# Patient Record
Sex: MALE | Race: WHITE | NOT HISPANIC OR LATINO | Employment: OTHER | URBAN - METROPOLITAN AREA
[De-identification: names, ages, dates, MRNs, and addresses within clinical notes are randomized per-mention and may not be internally consistent; named-entity substitution may affect disease eponyms.]

---

## 2019-03-20 ENCOUNTER — OFFICE VISIT (OUTPATIENT)
Dept: UROLOGY | Facility: CLINIC | Age: 84
End: 2019-03-20
Attending: UROLOGY
Payer: MEDICARE

## 2019-03-20 VITALS
HEART RATE: 60 BPM | DIASTOLIC BLOOD PRESSURE: 64 MMHG | HEIGHT: 67 IN | SYSTOLIC BLOOD PRESSURE: 152 MMHG | BODY MASS INDEX: 21.35 KG/M2 | WEIGHT: 136 LBS

## 2019-03-20 DIAGNOSIS — N28.1 RENAL CYST: Primary | ICD-10-CM

## 2019-03-20 PROCEDURE — 1101F PR PT FALLS ASSESS DOC 0-1 FALLS W/OUT INJ PAST YR: ICD-10-PCS | Mod: CPTII,S$GLB,, | Performed by: UROLOGY

## 2019-03-20 PROCEDURE — 3077F SYST BP >= 140 MM HG: CPT | Mod: CPTII,S$GLB,, | Performed by: UROLOGY

## 2019-03-20 PROCEDURE — 1101F PT FALLS ASSESS-DOCD LE1/YR: CPT | Mod: CPTII,S$GLB,, | Performed by: UROLOGY

## 2019-03-20 PROCEDURE — 99204 OFFICE O/P NEW MOD 45 MIN: CPT | Mod: S$GLB,,, | Performed by: UROLOGY

## 2019-03-20 PROCEDURE — 3078F DIAST BP <80 MM HG: CPT | Mod: CPTII,S$GLB,, | Performed by: UROLOGY

## 2019-03-20 PROCEDURE — 99999 PR PBB SHADOW E&M-EST. PATIENT-LVL III: CPT | Mod: PBBFAC,,, | Performed by: UROLOGY

## 2019-03-20 PROCEDURE — 3078F PR MOST RECENT DIASTOLIC BLOOD PRESSURE < 80 MM HG: ICD-10-PCS | Mod: CPTII,S$GLB,, | Performed by: UROLOGY

## 2019-03-20 PROCEDURE — 99204 PR OFFICE/OUTPT VISIT, NEW, LEVL IV, 45-59 MIN: ICD-10-PCS | Mod: S$GLB,,, | Performed by: UROLOGY

## 2019-03-20 PROCEDURE — 3077F PR MOST RECENT SYSTOLIC BLOOD PRESSURE >= 140 MM HG: ICD-10-PCS | Mod: CPTII,S$GLB,, | Performed by: UROLOGY

## 2019-03-20 PROCEDURE — 99999 PR PBB SHADOW E&M-EST. PATIENT-LVL III: ICD-10-PCS | Mod: PBBFAC,,, | Performed by: UROLOGY

## 2019-03-20 RX ORDER — AMLODIPINE BESYLATE 5 MG/1
2.5 TABLET ORAL DAILY
COMMUNITY

## 2019-03-20 RX ORDER — MULTIVITAMIN WITH IRON
TABLET ORAL
COMMUNITY
End: 2022-05-04

## 2019-03-20 RX ORDER — FLUTICASONE PROPIONATE AND SALMETEROL 100; 50 UG/1; UG/1
1 POWDER RESPIRATORY (INHALATION) 2 TIMES DAILY
COMMUNITY
End: 2022-05-04

## 2019-03-20 RX ORDER — LORATADINE 10 MG/1
10 TABLET ORAL DAILY
COMMUNITY
End: 2022-05-04

## 2019-03-20 NOTE — LETTER
March 20, 2019      [place]  [address]  [facility], [facility] [address]           [facility]  [address] [facility] [address]  Phone: [phone]          Patient: Bhupinder Null   MR Number: [id number]   YOB: 1935   Date of Visit: 3/20/2019       Dear Shaun Meza:    Thank you for referring Bhupinder Null to me for evaluation. Attached you will find relevant portions of my assessment and plan of care.    If you have questions, please do not hesitate to call me. I look forward to following Bhupinder Null along with you.    Sincerely,    Blayne Abarca Jr., MD    Enclosure  CC:  No Recipients    If you would like to receive this communication electronically, please contact [email or web address] or [phone] to request more information on "[employer] Link access.    For providers and/or their staff who would like to refer a patient to [facility], please contact us through our one-stop-shop provider referral line, [employer] [facility], at [phone].    If you feel you have received this communication in error or would no longer like to receive these types of communications, please e-mail [email or web address]

## 2019-03-20 NOTE — PROGRESS NOTES
Subjective:       Patient ID: Bhupinder Null is a 84 y.o. male.    Chief Complaint: Follow-up (referral from pcp/ test showed cyst on kidney )   Patient is here for evaluation of a right parapelvic cyst.  There is no obstruction.  He occasionally has some right flank pain but there is no evidence of hydronephrosis on the CT scan.  He does have degenerative disc disease and I think this is probably what is causing his back pain.  He is voiding well and has no complaints  HPI      Past Medical History:   Diagnosis Date    Arthritis     Coronary artery disease     Diabetes mellitus     GERD (gastroesophageal reflux disease)     Hyperlipidemia     Hypertension     Melanoma 12/2014    left cheek    RLS (restless legs syndrome)     S/P CABG (coronary artery bypass graft)     *4    Stroke 2013    left side was affected  -residual left upper arm    Thyroid disease        Past Surgical History:   Procedure Laterality Date    ARTHROPLASTY-HIP Right 8/16/2016    [facility] by [facility] at [facility]    BYPASS GRAFT      CARDIAC SURGERY      4 vessel CABG    COSMETIC SURGERY      left side of face  skin graft from neck    EYE SURGERY      B cataract surgery    JOINT REPLACEMENT      REPAIR-MOHS DEFECT( left superior, inferior anterior cheek) Left 12/11/2014    Performed by Pedro Lancaster MD at [facility] OR [address] [place] [address]       Family History   Problem Relation Age of Onset    Stroke Mother     Diabetes type II Mother     Stomach cancer Father     Skin cancer Brother     Breast cancer Sister     Skin cancer Sister        Social History     Socioeconomic History    Marital status:      Spouse name: Not on file    Number of children: Not on file    Years of education: 7    Highest education level: Not on file   Social Needs    Financial resource strain: Not on file    Food insecurity - worry: Not on file    Food insecurity - inability: Not on file    Transportation needs - medical: Not  on file    Transportation needs - non-medical: Not on file   Occupational History    Not on file   Tobacco Use    Smoking status: Former Smoker     Years: 10.00     Types: Cigarettes     Last attempt to quit: 1961     Years since quittin.9    Smokeless tobacco: Never Used   Substance and Sexual Activity    Alcohol use: No     Alcohol/week: 0.0 oz    Drug use: No    Sexual activity: Yes     Partners: Female   Other Topics Concern    Not on file   Social History Narrative    Not on file       Allergies:  Tylenol pm [diphenhydramine-acetaminophen]    Medications:    Current Outpatient Medications:     amLODIPine (NORVASC) 5 MG tablet, Take 2.5 mg by mouth once daily., Disp: , Rfl:     ascorbic acid (VITAMIN C) 500 MG tablet, Take 500 mg by mouth once daily., Disp: , Rfl:     aspirin 81 MG Chew, Take 81 mg by mouth once daily., Disp: , Rfl:     atorvastatin (LIPITOR) 40 MG tablet, Take 40 mg by mouth once daily. , Disp: , Rfl:     carbidopa-levodopa  mg (SINEMET)  mg per tablet, Take 1 tablet by mouth every evening., Disp: , Rfl:     fluticasone-salmeterol diskus inhaler 100-50 mcg, Inhale 1 puff into the lungs 2 (two) times daily. Controller, Disp: , Rfl:     gabapentin (NEURONTIN) 100 MG capsule, 100 mg once daily. morning, Disp: , Rfl:     gabapentin (NEURONTIN) 300 MG capsule, , Disp: , Rfl:     insulin NPH-insulin regular, 70/30, (NOVOLIN 70/30) 100 unit/mL (70-30) injection, 30 units every morning, Disp: , Rfl:     levothyroxine (SYNTHROID) 50 MCG tablet, 50 mcg once daily. , Disp: , Rfl:     loratadine (CLARITIN) 10 mg tablet, Take 10 mg by mouth once daily., Disp: , Rfl:     losartan-hydrochlorothiazide 100-12.5 mg (HYZAAR) 100-12.5 mg Tab, Take 1 tablet by mouth once daily. , Disp: , Rfl:     ranitidine (ZANTAC) 150 MG capsule, Take 150 mg by mouth nightly., Disp: , Rfl:     vit A and D3 in cod liver oil (COD LIVER OIL) 1,250-135 unit Cap, Take by mouth., Disp: ,  Rfl:     Review of Systems   Constitutional: Negative for activity change, appetite change, chills, diaphoresis, fatigue, fever and unexpected weight change.   HENT: Negative for congestion, dental problem, hearing loss, mouth sores, postnasal drip, rhinorrhea, sinus pressure and trouble swallowing.    Eyes: Negative for pain, discharge and itching.   Respiratory: Negative for apnea, cough, choking, chest tightness, shortness of breath and wheezing.    Cardiovascular: Negative for chest pain, palpitations and leg swelling.   Gastrointestinal: Negative for abdominal distention, abdominal pain, anal bleeding, blood in stool, constipation, diarrhea, nausea, rectal pain and vomiting.   Endocrine: Negative for polydipsia and polyuria.   Genitourinary: Negative for decreased urine volume, difficulty urinating, discharge, dysuria, enuresis, flank pain, frequency, genital sores, hematuria, penile pain, penile swelling, scrotal swelling, testicular pain and urgency.   Musculoskeletal: Positive for back pain. Negative for arthralgias and myalgias.   Skin: Negative for color change, rash and wound.   Neurological: Negative for dizziness, syncope, speech difficulty, light-headedness and headaches.   Hematological: Negative for adenopathy. Does not bruise/bleed easily.   Psychiatric/Behavioral: Negative for behavioral problems, confusion, hallucinations and sleep disturbance.       Objective:      Physical Exam   Constitutional: He appears well-developed.   HENT:   Head: Normocephalic.   Cardiovascular: Normal rate.    Pulmonary/Chest: Effort normal.   Abdominal: Soft.   Genitourinary: Prostate normal.   Genitourinary Comments: Prostate is 2530 g and benign there are no prostate nodules.   Neurological: He is alert.   Skin: Skin is warm.     Psychiatric: He has a normal mood and affect.       Assessment:       1. Renal cyst        Plan:       Bhupinder was seen today for follow-up.    Diagnoses and all orders for this  visit:    Renal cyst     Renal cysts without evidence of obstruction or malignancy  Return to clinic p.r.n.

## 2021-10-06 ENCOUNTER — OFFICE VISIT (OUTPATIENT)
Dept: SURGERY | Facility: CLINIC | Age: 86
End: 2021-10-06
Payer: MEDICARE

## 2021-10-06 ENCOUNTER — LAB VISIT (OUTPATIENT)
Dept: LAB | Facility: HOSPITAL | Age: 86
End: 2021-10-06
Attending: SURGERY
Payer: MEDICARE

## 2021-10-06 VITALS
BODY MASS INDEX: 20.82 KG/M2 | SYSTOLIC BLOOD PRESSURE: 142 MMHG | HEART RATE: 59 BPM | RESPIRATION RATE: 18 BRPM | HEIGHT: 67 IN | DIASTOLIC BLOOD PRESSURE: 68 MMHG | WEIGHT: 132.63 LBS

## 2021-10-06 DIAGNOSIS — K44.9 HIATAL HERNIA: ICD-10-CM

## 2021-10-06 DIAGNOSIS — K44.0 PARAESOPHAGEAL HERNIA WITH OBSTRUCTION BUT NO GANGRENE: Primary | ICD-10-CM

## 2021-10-06 DIAGNOSIS — Z95.1 HX OF CABG: ICD-10-CM

## 2021-10-06 DIAGNOSIS — E11.65 UNCONTROLLED TYPE 2 DIABETES MELLITUS WITH HYPERGLYCEMIA: ICD-10-CM

## 2021-10-06 DIAGNOSIS — I10 PRIMARY HYPERTENSION: ICD-10-CM

## 2021-10-06 DIAGNOSIS — R13.19 ESOPHAGEAL DYSPHAGIA: ICD-10-CM

## 2021-10-06 DIAGNOSIS — R13.10 DYSPHAGIA, UNSPECIFIED TYPE: ICD-10-CM

## 2021-10-06 DIAGNOSIS — Z91.119 NONCOMPLIANCE OF PATIENT WITH DIETARY REGIMEN: ICD-10-CM

## 2021-10-06 LAB
ALBUMIN SERPL BCP-MCNC: 3.4 G/DL (ref 3.5–5.2)
ALP SERPL-CCNC: 166 U/L (ref 55–135)
ALT SERPL W/O P-5'-P-CCNC: 20 U/L (ref 10–44)
ANION GAP SERPL CALC-SCNC: 11 MMOL/L (ref 8–16)
AST SERPL-CCNC: 23 U/L (ref 10–40)
BASOPHILS # BLD AUTO: 0.05 K/UL (ref 0–0.2)
BASOPHILS NFR BLD: 0.7 % (ref 0–1.9)
BILIRUB SERPL-MCNC: 0.4 MG/DL (ref 0.1–1)
BUN SERPL-MCNC: 17 MG/DL (ref 8–23)
CALCIUM SERPL-MCNC: 9.4 MG/DL (ref 8.7–10.5)
CHLORIDE SERPL-SCNC: 98 MMOL/L (ref 95–110)
CO2 SERPL-SCNC: 24 MMOL/L (ref 23–29)
CREAT SERPL-MCNC: 0.9 MG/DL (ref 0.5–1.4)
DIFFERENTIAL METHOD: ABNORMAL
EOSINOPHIL # BLD AUTO: 0.2 K/UL (ref 0–0.5)
EOSINOPHIL NFR BLD: 2.4 % (ref 0–8)
ERYTHROCYTE [DISTWIDTH] IN BLOOD BY AUTOMATED COUNT: 12.5 % (ref 11.5–14.5)
EST. GFR  (AFRICAN AMERICAN): >60 ML/MIN/1.73 M^2
EST. GFR  (NON AFRICAN AMERICAN): >60 ML/MIN/1.73 M^2
ESTIMATED AVG GLUCOSE: 295 MG/DL (ref 68–131)
GLUCOSE SERPL-MCNC: 141 MG/DL (ref 70–110)
HBA1C MFR BLD: 11.9 % (ref 4–5.6)
HCT VFR BLD AUTO: 33.4 % (ref 40–54)
HGB BLD-MCNC: 11.6 G/DL (ref 14–18)
IMM GRANULOCYTES # BLD AUTO: 0.03 K/UL (ref 0–0.04)
IMM GRANULOCYTES NFR BLD AUTO: 0.4 % (ref 0–0.5)
LYMPHOCYTES # BLD AUTO: 1 K/UL (ref 1–4.8)
LYMPHOCYTES NFR BLD: 13.7 % (ref 18–48)
MCH RBC QN AUTO: 32.5 PG (ref 27–31)
MCHC RBC AUTO-ENTMCNC: 34.7 G/DL (ref 32–36)
MCV RBC AUTO: 94 FL (ref 82–98)
MONOCYTES # BLD AUTO: 0.6 K/UL (ref 0.3–1)
MONOCYTES NFR BLD: 8.5 % (ref 4–15)
NEUTROPHILS # BLD AUTO: 5.2 K/UL (ref 1.8–7.7)
NEUTROPHILS NFR BLD: 74.3 % (ref 38–73)
NRBC BLD-RTO: 0 /100 WBC
PLATELET # BLD AUTO: 292 K/UL (ref 150–450)
PMV BLD AUTO: 9.3 FL (ref 9.2–12.9)
POTASSIUM SERPL-SCNC: 4.3 MMOL/L (ref 3.5–5.1)
PREALB SERPL-MCNC: 13 MG/DL (ref 20–43)
PROT SERPL-MCNC: 6.5 G/DL (ref 6–8.4)
RBC # BLD AUTO: 3.57 M/UL (ref 4.6–6.2)
SODIUM SERPL-SCNC: 133 MMOL/L (ref 136–145)
WBC # BLD AUTO: 7.03 K/UL (ref 3.9–12.7)

## 2021-10-06 PROCEDURE — 99205 PR OFFICE/OUTPT VISIT, NEW, LEVL V, 60-74 MIN: ICD-10-PCS | Mod: S$GLB,,, | Performed by: SURGERY

## 2021-10-06 PROCEDURE — 99205 OFFICE O/P NEW HI 60 MIN: CPT | Mod: S$GLB,,, | Performed by: SURGERY

## 2021-10-06 PROCEDURE — 3288F PR FALLS RISK ASSESSMENT DOCUMENTED: ICD-10-PCS | Mod: CPTII,S$GLB,, | Performed by: SURGERY

## 2021-10-06 PROCEDURE — 1160F RVW MEDS BY RX/DR IN RCRD: CPT | Mod: CPTII,S$GLB,, | Performed by: SURGERY

## 2021-10-06 PROCEDURE — 1160F PR REVIEW ALL MEDS BY PRESCRIBER/CLIN PHARMACIST DOCUMENTED: ICD-10-PCS | Mod: CPTII,S$GLB,, | Performed by: SURGERY

## 2021-10-06 PROCEDURE — 1157F ADVNC CARE PLAN IN RCRD: CPT | Mod: CPTII,S$GLB,, | Performed by: SURGERY

## 2021-10-06 PROCEDURE — 1125F AMNT PAIN NOTED PAIN PRSNT: CPT | Mod: CPTII,S$GLB,, | Performed by: SURGERY

## 2021-10-06 PROCEDURE — 99999 PR PBB SHADOW E&M-EST. PATIENT-LVL IV: ICD-10-PCS | Mod: PBBFAC,,, | Performed by: SURGERY

## 2021-10-06 PROCEDURE — 80053 COMPREHEN METABOLIC PANEL: CPT | Performed by: SURGERY

## 2021-10-06 PROCEDURE — 1101F PR PT FALLS ASSESS DOC 0-1 FALLS W/OUT INJ PAST YR: ICD-10-PCS | Mod: CPTII,S$GLB,, | Performed by: SURGERY

## 2021-10-06 PROCEDURE — 1101F PT FALLS ASSESS-DOCD LE1/YR: CPT | Mod: CPTII,S$GLB,, | Performed by: SURGERY

## 2021-10-06 PROCEDURE — 1159F PR MEDICATION LIST DOCUMENTED IN MEDICAL RECORD: ICD-10-PCS | Mod: CPTII,S$GLB,, | Performed by: SURGERY

## 2021-10-06 PROCEDURE — 99999 PR PBB SHADOW E&M-EST. PATIENT-LVL IV: CPT | Mod: PBBFAC,,, | Performed by: SURGERY

## 2021-10-06 PROCEDURE — 83036 HEMOGLOBIN GLYCOSYLATED A1C: CPT | Performed by: SURGERY

## 2021-10-06 PROCEDURE — 85025 COMPLETE CBC W/AUTO DIFF WBC: CPT | Performed by: SURGERY

## 2021-10-06 PROCEDURE — 1157F PR ADVANCE CARE PLAN OR EQUIV PRESENT IN MEDICAL RECORD: ICD-10-PCS | Mod: CPTII,S$GLB,, | Performed by: SURGERY

## 2021-10-06 PROCEDURE — 84134 ASSAY OF PREALBUMIN: CPT | Performed by: SURGERY

## 2021-10-06 PROCEDURE — 1125F PR PAIN SEVERITY QUANTIFIED, PAIN PRESENT: ICD-10-PCS | Mod: CPTII,S$GLB,, | Performed by: SURGERY

## 2021-10-06 PROCEDURE — 36415 COLL VENOUS BLD VENIPUNCTURE: CPT | Performed by: SURGERY

## 2021-10-06 PROCEDURE — 1159F MED LIST DOCD IN RCRD: CPT | Mod: CPTII,S$GLB,, | Performed by: SURGERY

## 2021-10-06 PROCEDURE — 3288F FALL RISK ASSESSMENT DOCD: CPT | Mod: CPTII,S$GLB,, | Performed by: SURGERY

## 2021-10-06 RX ORDER — ONDANSETRON 4 MG/1
4 TABLET, ORALLY DISINTEGRATING ORAL EVERY 6 HOURS PRN
COMMUNITY
Start: 2021-10-01 | End: 2022-05-04

## 2021-10-06 RX ORDER — OLMESARTAN MEDOXOMIL AND HYDROCHLOROTHIAZIDE 40/12.5 40; 12.5 MG/1; MG/1
TABLET ORAL
COMMUNITY
End: 2022-05-04

## 2021-10-06 RX ORDER — NABUMETONE 500 MG/1
TABLET, FILM COATED ORAL
COMMUNITY
End: 2022-05-04

## 2021-10-06 RX ORDER — PANTOPRAZOLE SODIUM 40 MG/1
TABLET, DELAYED RELEASE ORAL
COMMUNITY

## 2021-10-06 RX ORDER — DICYCLOMINE HYDROCHLORIDE 20 MG/1
20 TABLET ORAL EVERY 6 HOURS PRN
COMMUNITY
Start: 2021-10-01 | End: 2022-05-04

## 2021-10-06 RX ORDER — HYDROXYZINE HYDROCHLORIDE 10 MG/1
TABLET, FILM COATED ORAL
COMMUNITY
End: 2022-05-04

## 2021-10-06 RX ORDER — LOSARTAN POTASSIUM 25 MG/1
25 TABLET ORAL NIGHTLY
COMMUNITY
Start: 2021-09-27 | End: 2022-05-04

## 2021-10-06 RX ORDER — ROPINIROLE 1 MG/1
1 TABLET, FILM COATED ORAL
COMMUNITY
Start: 2021-10-04

## 2021-10-06 RX ORDER — INSULIN LISPRO 100 [IU]/ML
INJECTION, SUSPENSION SUBCUTANEOUS
COMMUNITY

## 2021-10-06 RX ORDER — NAPROXEN 500 MG/1
TABLET ORAL
COMMUNITY
End: 2022-05-04

## 2022-05-03 PROBLEM — Z91.148 H/O MEDICATION NONCOMPLIANCE: Status: ACTIVE | Noted: 2022-05-03

## 2022-05-03 PROBLEM — I50.9 ACUTE EXACERBATION OF CONGESTIVE HEART FAILURE: Status: ACTIVE | Noted: 2022-05-03

## 2022-05-03 PROBLEM — I51.89 DIASTOLIC DYSFUNCTION: Status: ACTIVE | Noted: 2022-05-03

## 2022-05-03 PROBLEM — M79.89 LEG SWELLING: Status: ACTIVE | Noted: 2022-05-03

## 2022-07-15 ENCOUNTER — HOSPITAL ENCOUNTER (INPATIENT)
Facility: HOSPITAL | Age: 87
LOS: 2 days | Discharge: HOME | DRG: 327 | End: 2022-07-18
Attending: EMERGENCY MEDICINE | Admitting: SURGERY
Payer: MEDICARE

## 2022-07-15 DIAGNOSIS — K31.1 GASTRIC OUTLET OBSTRUCTION: ICD-10-CM

## 2022-07-15 DIAGNOSIS — K44.9 HIATAL HERNIA: Primary | ICD-10-CM

## 2022-07-15 PROCEDURE — 99285 PR EMERGENCY DEPT VISIT,LEVEL V: ICD-10-PCS | Mod: ,,, | Performed by: EMERGENCY MEDICINE

## 2022-07-15 PROCEDURE — 99285 EMERGENCY DEPT VISIT HI MDM: CPT | Mod: ,,, | Performed by: EMERGENCY MEDICINE

## 2022-07-16 ENCOUNTER — ANESTHESIA EVENT (OUTPATIENT)
Dept: ENDOSCOPY | Facility: HOSPITAL | Age: 87
DRG: 327 | End: 2022-07-16
Payer: MEDICARE

## 2022-07-16 ENCOUNTER — ANESTHESIA (OUTPATIENT)
Dept: ENDOSCOPY | Facility: HOSPITAL | Age: 87
DRG: 327 | End: 2022-07-16
Payer: MEDICARE

## 2022-07-16 PROBLEM — K31.1 GASTRIC OUTLET OBSTRUCTION: Status: ACTIVE | Noted: 2022-07-16

## 2022-07-16 LAB
ALBUMIN SERPL BCP-MCNC: 3.1 G/DL (ref 3.5–5.2)
ALP SERPL-CCNC: 97 U/L (ref 55–135)
ALT SERPL W/O P-5'-P-CCNC: 11 U/L (ref 10–44)
ANION GAP SERPL CALC-SCNC: 10 MMOL/L (ref 8–16)
AST SERPL-CCNC: 17 U/L (ref 10–40)
BASOPHILS # BLD AUTO: 0.05 K/UL (ref 0–0.2)
BASOPHILS NFR BLD: 0.5 % (ref 0–1.9)
BILIRUB SERPL-MCNC: 0.8 MG/DL (ref 0.1–1)
BUN SERPL-MCNC: 39 MG/DL (ref 8–23)
CALCIUM SERPL-MCNC: 8.7 MG/DL (ref 8.7–10.5)
CHLORIDE SERPL-SCNC: 105 MMOL/L (ref 95–110)
CO2 SERPL-SCNC: 30 MMOL/L (ref 23–29)
CREAT SERPL-MCNC: 1.9 MG/DL (ref 0.5–1.4)
DIFFERENTIAL METHOD: ABNORMAL
EOSINOPHIL # BLD AUTO: 0.1 K/UL (ref 0–0.5)
EOSINOPHIL NFR BLD: 1.5 % (ref 0–8)
ERYTHROCYTE [DISTWIDTH] IN BLOOD BY AUTOMATED COUNT: 18.6 % (ref 11.5–14.5)
EST. GFR  (AFRICAN AMERICAN): 35.8 ML/MIN/1.73 M^2
EST. GFR  (NON AFRICAN AMERICAN): 31 ML/MIN/1.73 M^2
ESTIMATED AVG GLUCOSE: 169 MG/DL (ref 68–131)
GLUCOSE SERPL-MCNC: 276 MG/DL (ref 70–110)
HBA1C MFR BLD: 7.5 % (ref 4–5.6)
HCT VFR BLD AUTO: 29.4 % (ref 40–54)
HGB BLD-MCNC: 9.6 G/DL (ref 14–18)
IMM GRANULOCYTES # BLD AUTO: 0.04 K/UL (ref 0–0.04)
IMM GRANULOCYTES NFR BLD AUTO: 0.4 % (ref 0–0.5)
LACTATE SERPL-SCNC: 1.4 MMOL/L (ref 0.5–2.2)
LYMPHOCYTES # BLD AUTO: 0.6 K/UL (ref 1–4.8)
LYMPHOCYTES NFR BLD: 6.8 % (ref 18–48)
MAGNESIUM SERPL-MCNC: 2.3 MG/DL (ref 1.6–2.6)
MCH RBC QN AUTO: 30.2 PG (ref 27–31)
MCHC RBC AUTO-ENTMCNC: 32.7 G/DL (ref 32–36)
MCV RBC AUTO: 93 FL (ref 82–98)
MONOCYTES # BLD AUTO: 0.8 K/UL (ref 0.3–1)
MONOCYTES NFR BLD: 8.1 % (ref 4–15)
NEUTROPHILS # BLD AUTO: 7.8 K/UL (ref 1.8–7.7)
NEUTROPHILS NFR BLD: 82.7 % (ref 38–73)
NRBC BLD-RTO: 0 /100 WBC
PHOSPHATE SERPL-MCNC: 3.1 MG/DL (ref 2.7–4.5)
PLATELET # BLD AUTO: 190 K/UL (ref 150–450)
PMV BLD AUTO: 10 FL (ref 9.2–12.9)
POCT GLUCOSE: 200 MG/DL (ref 70–110)
POCT GLUCOSE: 275 MG/DL (ref 70–110)
POCT GLUCOSE: 307 MG/DL (ref 70–110)
POTASSIUM SERPL-SCNC: 3.9 MMOL/L (ref 3.5–5.1)
PROT SERPL-MCNC: 6.4 G/DL (ref 6–8.4)
RBC # BLD AUTO: 3.18 M/UL (ref 4.6–6.2)
SODIUM SERPL-SCNC: 145 MMOL/L (ref 136–145)
WBC # BLD AUTO: 9.48 K/UL (ref 3.9–12.7)

## 2022-07-16 PROCEDURE — 99285 EMERGENCY DEPT VISIT HI MDM: CPT | Mod: 25

## 2022-07-16 PROCEDURE — 25000003 PHARM REV CODE 250: Performed by: NURSE ANESTHETIST, CERTIFIED REGISTERED

## 2022-07-16 PROCEDURE — 37000008 HC ANESTHESIA 1ST 15 MINUTES: Performed by: INTERNAL MEDICINE

## 2022-07-16 PROCEDURE — 43241 PR EGD, FLEX, W/INSERT, INTRALUMINAL TUBE/CATH: ICD-10-PCS | Mod: GC,,, | Performed by: INTERNAL MEDICINE

## 2022-07-16 PROCEDURE — 96374 THER/PROPH/DIAG INJ IV PUSH: CPT

## 2022-07-16 PROCEDURE — 43241 EGD TUBE/CATH INSERTION: CPT | Mod: GC,,, | Performed by: INTERNAL MEDICINE

## 2022-07-16 PROCEDURE — 63600175 PHARM REV CODE 636 W HCPCS: Performed by: STUDENT IN AN ORGANIZED HEALTH CARE EDUCATION/TRAINING PROGRAM

## 2022-07-16 PROCEDURE — 80053 COMPREHEN METABOLIC PANEL: CPT | Performed by: STUDENT IN AN ORGANIZED HEALTH CARE EDUCATION/TRAINING PROGRAM

## 2022-07-16 PROCEDURE — 99223 1ST HOSP IP/OBS HIGH 75: CPT | Mod: 25,,, | Performed by: INTERNAL MEDICINE

## 2022-07-16 PROCEDURE — 84100 ASSAY OF PHOSPHORUS: CPT | Performed by: STUDENT IN AN ORGANIZED HEALTH CARE EDUCATION/TRAINING PROGRAM

## 2022-07-16 PROCEDURE — 63600175 PHARM REV CODE 636 W HCPCS: Performed by: EMERGENCY MEDICINE

## 2022-07-16 PROCEDURE — 11000001 HC ACUTE MED/SURG PRIVATE ROOM

## 2022-07-16 PROCEDURE — C9113 INJ PANTOPRAZOLE SODIUM, VIA: HCPCS | Performed by: STUDENT IN AN ORGANIZED HEALTH CARE EDUCATION/TRAINING PROGRAM

## 2022-07-16 PROCEDURE — 63600175 PHARM REV CODE 636 W HCPCS: Performed by: NURSE ANESTHETIST, CERTIFIED REGISTERED

## 2022-07-16 PROCEDURE — 99223 PR INITIAL HOSPITAL CARE,LEVL III: ICD-10-PCS | Mod: AI,GC,, | Performed by: SURGERY

## 2022-07-16 PROCEDURE — D9220A PRA ANESTHESIA: Mod: ANES,,, | Performed by: ANESTHESIOLOGY

## 2022-07-16 PROCEDURE — 83605 ASSAY OF LACTIC ACID: CPT | Performed by: STUDENT IN AN ORGANIZED HEALTH CARE EDUCATION/TRAINING PROGRAM

## 2022-07-16 PROCEDURE — C9399 UNCLASSIFIED DRUGS OR BIOLOG: HCPCS | Performed by: STUDENT IN AN ORGANIZED HEALTH CARE EDUCATION/TRAINING PROGRAM

## 2022-07-16 PROCEDURE — 99223 1ST HOSP IP/OBS HIGH 75: CPT | Mod: AI,GC,, | Performed by: SURGERY

## 2022-07-16 PROCEDURE — 85025 COMPLETE CBC W/AUTO DIFF WBC: CPT | Performed by: STUDENT IN AN ORGANIZED HEALTH CARE EDUCATION/TRAINING PROGRAM

## 2022-07-16 PROCEDURE — 25000003 PHARM REV CODE 250: Performed by: STUDENT IN AN ORGANIZED HEALTH CARE EDUCATION/TRAINING PROGRAM

## 2022-07-16 PROCEDURE — 43241 EGD TUBE/CATH INSERTION: CPT | Performed by: INTERNAL MEDICINE

## 2022-07-16 PROCEDURE — 99223 PR INITIAL HOSPITAL CARE,LEVL III: ICD-10-PCS | Mod: 25,,, | Performed by: INTERNAL MEDICINE

## 2022-07-16 PROCEDURE — D9220A PRA ANESTHESIA: Mod: CRNA,,, | Performed by: NURSE ANESTHETIST, CERTIFIED REGISTERED

## 2022-07-16 PROCEDURE — D9220A PRA ANESTHESIA: ICD-10-PCS | Mod: CRNA,,, | Performed by: NURSE ANESTHETIST, CERTIFIED REGISTERED

## 2022-07-16 PROCEDURE — 83036 HEMOGLOBIN GLYCOSYLATED A1C: CPT | Performed by: STUDENT IN AN ORGANIZED HEALTH CARE EDUCATION/TRAINING PROGRAM

## 2022-07-16 PROCEDURE — 83735 ASSAY OF MAGNESIUM: CPT | Performed by: STUDENT IN AN ORGANIZED HEALTH CARE EDUCATION/TRAINING PROGRAM

## 2022-07-16 PROCEDURE — D9220A PRA ANESTHESIA: ICD-10-PCS | Mod: ANES,,, | Performed by: ANESTHESIOLOGY

## 2022-07-16 PROCEDURE — 37000009 HC ANESTHESIA EA ADD 15 MINS: Performed by: INTERNAL MEDICINE

## 2022-07-16 RX ORDER — SUCCINYLCHOLINE CHLORIDE 20 MG/ML
INJECTION INTRAMUSCULAR; INTRAVENOUS
Status: DISCONTINUED | OUTPATIENT
Start: 2022-07-16 | End: 2022-07-16

## 2022-07-16 RX ORDER — HYDROMORPHONE HYDROCHLORIDE 1 MG/ML
0.2 INJECTION, SOLUTION INTRAMUSCULAR; INTRAVENOUS; SUBCUTANEOUS EVERY 4 HOURS PRN
Status: DISCONTINUED | OUTPATIENT
Start: 2022-07-16 | End: 2022-07-16

## 2022-07-16 RX ORDER — HEPARIN SODIUM 5000 [USP'U]/ML
5000 INJECTION, SOLUTION INTRAVENOUS; SUBCUTANEOUS EVERY 8 HOURS
Status: DISCONTINUED | OUTPATIENT
Start: 2022-07-16 | End: 2022-07-18 | Stop reason: HOSPADM

## 2022-07-16 RX ORDER — GLUCAGON 1 MG
1 KIT INJECTION
Status: DISCONTINUED | OUTPATIENT
Start: 2022-07-16 | End: 2022-07-18 | Stop reason: HOSPADM

## 2022-07-16 RX ORDER — PROPOFOL 10 MG/ML
VIAL (ML) INTRAVENOUS
Status: DISCONTINUED | OUTPATIENT
Start: 2022-07-16 | End: 2022-07-16

## 2022-07-16 RX ORDER — EPHEDRINE SULFATE 50 MG/ML
INJECTION, SOLUTION INTRAVENOUS
Status: DISCONTINUED | OUTPATIENT
Start: 2022-07-16 | End: 2022-07-16

## 2022-07-16 RX ORDER — PANTOPRAZOLE SODIUM 40 MG/10ML
40 INJECTION, POWDER, LYOPHILIZED, FOR SOLUTION INTRAVENOUS DAILY
Status: DISCONTINUED | OUTPATIENT
Start: 2022-07-16 | End: 2022-07-18 | Stop reason: HOSPADM

## 2022-07-16 RX ORDER — LIDOCAINE HCL/PF 100 MG/5ML
SYRINGE (ML) INTRAVENOUS
Status: DISCONTINUED | OUTPATIENT
Start: 2022-07-16 | End: 2022-07-16

## 2022-07-16 RX ORDER — MORPHINE SULFATE 4 MG/ML
4 INJECTION, SOLUTION INTRAMUSCULAR; INTRAVENOUS EVERY 4 HOURS PRN
Status: DISCONTINUED | OUTPATIENT
Start: 2022-07-16 | End: 2022-07-18 | Stop reason: HOSPADM

## 2022-07-16 RX ORDER — FENTANYL CITRATE 50 UG/ML
INJECTION, SOLUTION INTRAMUSCULAR; INTRAVENOUS
Status: DISCONTINUED | OUTPATIENT
Start: 2022-07-16 | End: 2022-07-16

## 2022-07-16 RX ORDER — ROCURONIUM BROMIDE 10 MG/ML
INJECTION, SOLUTION INTRAVENOUS
Status: DISCONTINUED | OUTPATIENT
Start: 2022-07-16 | End: 2022-07-16

## 2022-07-16 RX ORDER — INSULIN ASPART 100 [IU]/ML
0-5 INJECTION, SOLUTION INTRAVENOUS; SUBCUTANEOUS EVERY 6 HOURS PRN
Status: DISCONTINUED | OUTPATIENT
Start: 2022-07-16 | End: 2022-07-18 | Stop reason: HOSPADM

## 2022-07-16 RX ORDER — ENOXAPARIN SODIUM 100 MG/ML
40 INJECTION SUBCUTANEOUS EVERY 24 HOURS
Status: DISCONTINUED | OUTPATIENT
Start: 2022-07-16 | End: 2022-07-16

## 2022-07-16 RX ORDER — SODIUM CHLORIDE 0.9 % (FLUSH) 0.9 %
10 SYRINGE (ML) INJECTION
Status: DISCONTINUED | OUTPATIENT
Start: 2022-07-16 | End: 2022-07-18 | Stop reason: HOSPADM

## 2022-07-16 RX ORDER — ONDANSETRON 2 MG/ML
4 INJECTION INTRAMUSCULAR; INTRAVENOUS EVERY 6 HOURS
Status: DISCONTINUED | OUTPATIENT
Start: 2022-07-16 | End: 2022-07-18 | Stop reason: HOSPADM

## 2022-07-16 RX ORDER — MIDAZOLAM HYDROCHLORIDE 1 MG/ML
0.5 INJECTION INTRAMUSCULAR; INTRAVENOUS
Status: COMPLETED | OUTPATIENT
Start: 2022-07-16 | End: 2022-07-16

## 2022-07-16 RX ORDER — NAPROXEN SODIUM 220 MG/1
81 TABLET, FILM COATED ORAL DAILY
Status: DISCONTINUED | OUTPATIENT
Start: 2022-07-16 | End: 2022-07-18 | Stop reason: HOSPADM

## 2022-07-16 RX ORDER — MIDAZOLAM HYDROCHLORIDE 1 MG/ML
0.5 INJECTION INTRAMUSCULAR; INTRAVENOUS ONCE
Status: DISCONTINUED | OUTPATIENT
Start: 2022-07-16 | End: 2022-07-16

## 2022-07-16 RX ORDER — SODIUM CHLORIDE, SODIUM LACTATE, POTASSIUM CHLORIDE, CALCIUM CHLORIDE 600; 310; 30; 20 MG/100ML; MG/100ML; MG/100ML; MG/100ML
INJECTION, SOLUTION INTRAVENOUS CONTINUOUS
Status: DISCONTINUED | OUTPATIENT
Start: 2022-07-16 | End: 2022-07-18 | Stop reason: HOSPADM

## 2022-07-16 RX ORDER — MORPHINE SULFATE 2 MG/ML
2 INJECTION, SOLUTION INTRAMUSCULAR; INTRAVENOUS EVERY 4 HOURS PRN
Status: DISCONTINUED | OUTPATIENT
Start: 2022-07-16 | End: 2022-07-18 | Stop reason: HOSPADM

## 2022-07-16 RX ORDER — LEVOTHYROXINE SODIUM ANHYDROUS 100 UG/5ML
25 INJECTION, POWDER, LYOPHILIZED, FOR SOLUTION INTRAVENOUS DAILY
Status: DISCONTINUED | OUTPATIENT
Start: 2022-07-16 | End: 2022-07-17

## 2022-07-16 RX ADMIN — SODIUM CHLORIDE: 0.9 INJECTION, SOLUTION INTRAVENOUS at 11:07

## 2022-07-16 RX ADMIN — ROCURONIUM BROMIDE 5 MG: 10 INJECTION, SOLUTION INTRAVENOUS at 12:07

## 2022-07-16 RX ADMIN — ONDANSETRON 4 MG: 2 INJECTION INTRAMUSCULAR; INTRAVENOUS at 06:07

## 2022-07-16 RX ADMIN — MIDAZOLAM 0.5 MG: 1 INJECTION INTRAMUSCULAR; INTRAVENOUS at 12:07

## 2022-07-16 RX ADMIN — Medication 60 MG: at 12:07

## 2022-07-16 RX ADMIN — ONDANSETRON 4 MG: 2 INJECTION INTRAMUSCULAR; INTRAVENOUS at 02:07

## 2022-07-16 RX ADMIN — SUCCINYLCHOLINE CHLORIDE 120 MG: 20 INJECTION, SOLUTION INTRAMUSCULAR; INTRAVENOUS at 12:07

## 2022-07-16 RX ADMIN — PANTOPRAZOLE SODIUM 40 MG: 40 INJECTION, POWDER, FOR SOLUTION INTRAVENOUS at 09:07

## 2022-07-16 RX ADMIN — INSULIN DETEMIR 8 UNITS: 100 INJECTION, SOLUTION SUBCUTANEOUS at 09:07

## 2022-07-16 RX ADMIN — HEPARIN SODIUM 5000 UNITS: 5000 INJECTION INTRAVENOUS; SUBCUTANEOUS at 09:07

## 2022-07-16 RX ADMIN — SODIUM CHLORIDE, SODIUM LACTATE, POTASSIUM CHLORIDE, AND CALCIUM CHLORIDE: .6; .31; .03; .02 INJECTION, SOLUTION INTRAVENOUS at 02:07

## 2022-07-16 RX ADMIN — FENTANYL CITRATE 50 MCG: 50 INJECTION, SOLUTION INTRAMUSCULAR; INTRAVENOUS at 11:07

## 2022-07-16 RX ADMIN — SODIUM CHLORIDE, SODIUM LACTATE, POTASSIUM CHLORIDE, AND CALCIUM CHLORIDE 1000 ML: .6; .31; .03; .02 INJECTION, SOLUTION INTRAVENOUS at 09:07

## 2022-07-16 RX ADMIN — HEPARIN SODIUM 5000 UNITS: 5000 INJECTION INTRAVENOUS; SUBCUTANEOUS at 02:07

## 2022-07-16 RX ADMIN — ONDANSETRON 4 MG: 2 INJECTION INTRAMUSCULAR; INTRAVENOUS at 11:07

## 2022-07-16 RX ADMIN — EPHEDRINE SULFATE 15 MG: 50 INJECTION INTRAVENOUS at 12:07

## 2022-07-16 RX ADMIN — PROPOFOL 80 MG: 10 INJECTION, EMULSION INTRAVENOUS at 12:07

## 2022-07-16 RX ADMIN — ASPIRIN 81 MG CHEWABLE TABLET 81 MG: 81 TABLET CHEWABLE at 09:07

## 2022-07-16 RX ADMIN — EPHEDRINE SULFATE 10 MG: 50 INJECTION INTRAVENOUS at 12:07

## 2022-07-16 RX ADMIN — INSULIN ASPART 3 UNITS: 100 INJECTION, SOLUTION INTRAVENOUS; SUBCUTANEOUS at 11:07

## 2022-07-16 RX ADMIN — LEVOTHYROXINE SODIUM 25 MCG: 20 INJECTION, SOLUTION INTRAVENOUS at 11:07

## 2022-07-16 NOTE — HPI
Bhupinder Null is a 87 y.o. male with PMHx of HTN, CAD s/p CABG x4 who presents to ED as transfer with gastric outlet obstruction. Two days ago he developed upper abdominal pain.  He had one small episode of emesis at that time, denies emesis today. He notes a BM today. Nurse at OSH made two attempts at NGT placement but unsuccessful and patient refused further attempts. He has had similar symptoms in the past, was previously evaluated for HH repair though never scheduled. Denies prior abd surgeries.   CT demonstrates a markedly dilated stomach. He is hemodynamically stable. WBC of 11.9, labs overall unremarkable.

## 2022-07-16 NOTE — PROGRESS NOTES
GI Post Procedure Treatment Plan    Interval history:  EGD completed.   Large paraesophageal hernia with a large amount of food. NGT placed with difficulty, there was resistant in the nasopharynx.  Gastric erythema.  Dilated duodenum.    Plan:  - NPO  - NGT to LIS  - consider upper GI series  - can repeat EGD on Monday for evaluation for gastric masses, however 90% of the stomach was visualized, there were no masses in the antrum or pylorus which with excellent visualization.    Yvonne Catalan MD  GI Fellow, PGY-6

## 2022-07-16 NOTE — ANESTHESIA PREPROCEDURE EVALUATION
[facility]  Anesthesia Pre-Operative Evaluation         Patient Name: Bhupinder Null  YOB: 1935  MRN: [id number]    SUBJECTIVE:     Pre-operative evaluation for Procedure(s) (LRB):  EGD (ESOPHAGOGASTRODUODENOSCOPY) (N/A)     07/16/2022    Bhupinder Null is a 87 y.o. male w/ a significant PMHx of HTN, CVA, CAD s/p CABG x4, CHF, DM II and hypothyroidism who presents to ED as transfer with gastric outlet obstruction. NGT placement unsuccessful in the ED. Will now be placed with GI.     Patient now presents for the above procedure(s).    At the time of my evaluation, pt oriented to self and partly to situation, not to time. Consent obtained from family. Labs significant for ORESTES [sCr 1.9] and hyperglycemia [307].    2D ECHO [05/04/2022]:  TTE:  - EF 35%  - severe left atrium enlargement  - moderate MR, mild AI  - right ventricular systolic function is mildly decreased. Pa pressure 28 mmHg    LDA:        Peripheral IV - Single Lumen 07/15/22 20 G Left Forearm (Active)   Site Assessment Clean;Dry;Intact 07/15/22 1743   Number of days: 1       Prev airway[2014]:   Method of Intubation: Direct laryngoscopy  Mask Ventilation: Easy - oral  Grade: Grade II  Complicating Factors: Anterior larynx, Large/Floppy epiglottis    Drips:    lactated ringers 125 mL/hr at 07/16/22 0202       Patient Active Problem List   Diagnosis    Stroke    HTN (hypertension)    Hx of 4 vessel CABG    CAD (coronary artery disease)    Poorly controlled diabetes mellitus    Enlarged LA (left atrium)    Noncompliance of patient with dietary regimen    Mohs defect of left cheek    Hip arthritis    OA (osteoarthritis) of hip    Acute exacerbation of congestive heart failure    Leg swelling    H/O medication noncompliance    Diastolic dysfunction    Gastric outlet obstruction       Review of patient's allergies indicates:   Allergen Reactions    Tylenol pm [diphenhydramine-acetaminophen]      hallucinates        Current Inpatient Medications:   aspirin  81 mg Oral Daily    heparin (porcine)  5,000 Units Subcutaneous Q8H    insulin detemir U-100  8 Units Subcutaneous Daily    levothyroxine  25 mcg Intravenous Daily    ondansetron  4 mg Intravenous Q6H    pantoprazole  40 mg Intravenous Daily       No current facility-administered medications on file prior to encounter.     Current Outpatient Medications on File Prior to Encounter   Medication Sig Dispense Refill    amLODIPine (NORVASC) 5 MG tablet Take 2.5 mg by mouth once daily.      aspirin 81 MG Chew Take 81 mg by mouth once daily.      atorvastatin (LIPITOR) 40 MG tablet Take 40 mg by mouth once daily.       cilostazol (PLETAL) 50 MG Tab Take 50 mg by mouth 2 (two) times daily.      furosemide (LASIX) 20 MG tablet Take 1 tablet (20 mg total) by mouth once daily. 90 tablet 3    gabapentin (NEURONTIN) 100 MG capsule 100 mg 2 (two) times daily.      insulin lispro protamine-insulin lispro (HUMALOG MIX 75-25,U-100,INSULN) 100 unit/mL (75-25) Susp Humalog Mix 75-25 (U-100) Insulin 100 unit/mL subcutaneous suspension   INJECT 20 UNITS UNDER THE SKIN EVERY DAY      levothyroxine (TIROSINT) 75 mcg Cap Take 1 capsule by mouth once daily.      meloxicam (MOBIC) 7.5 MG tablet Take 7.5 mg by mouth 2 (two) times daily.      ondansetron (ZOFRAN-ODT) 8 MG TbDL Take 1 tablet (8 mg total) by mouth every 8 (eight) hours as needed (Nausea). 60 tablet 3    pantoprazole (PROTONIX) 40 MG tablet pantoprazole 40 mg tablet,delayed release   TAKE 1 TABLET BY MOUTH EVERY DAY AS DIRECTED      rOPINIRole (REQUIP) 1 MG tablet Take 1 mg by mouth. Take 1 tablet in morning. Take 3 tablets at night.      senna-docusate 8.6-50 mg (PERICOLACE) 8.6-50 mg per tablet Take 1 tablet by mouth 2 (two) times daily. 60 tablet 3       Past Surgical History:   Procedure Laterality Date    BYPASS GRAFT      CARDIAC SURGERY      4 vessel CABG    COSMETIC SURGERY      left side of face  skin  graft from neck    EYE SURGERY      B cataract surgery    JOINT REPLACEMENT         OBJECTIVE:     Vital Signs Range (Last 24H):  Temp:  [36.8 °C (98.3 °F)-37.3 °C (99.1 °F)]   Pulse:  [55-82]   Resp:  [16-20]   BP: (113-163)/(58-82)   SpO2:  [93 %-99 %]       Significant Labs:  Lab Results   Component Value Date    WBC 9.48 07/16/2022    HGB 9.6 (L) 07/16/2022    HCT 29.4 (L) 07/16/2022     07/16/2022     07/16/2022    K 3.9 07/16/2022     07/16/2022    CREATININE 1.9 (H) 07/16/2022    BUN 39 (H) 07/16/2022    CO2 30 (H) 07/16/2022    INR 1.1 08/18/2016    HGBA1C 7.5 (H) 07/16/2022       Diagnostic Studies: No relevant studies.    EKG:   Results for orders placed or performed during the hospital encounter of 07/15/22   EKG 12-lead    Collection Time: 07/15/22  5:54 PM    Narrative    Test Reason : R11.2,    Vent. Rate : 081 BPM     Atrial Rate : 081 BPM     P-R Int : 178 ms          QRS Dur : 136 ms      QT Int : 462 ms       P-R-T Axes : 085 -41 056 degrees     QTc Int : 536 ms    Normal sinus rhythm  Left axis deviation  Right bundle branch block  Abnormal ECG  When compared with ECG of 02-JUN-2022 16:18,  The axis Shifted left  Nonspecific T wave abnormality no longer evident in Lateral leads  QT has lengthened    Referred By: SKY TAN MD           Confirmed By:          ASSESSMENT/PLAN:       Pre-op Assessment    I have reviewed the Patient Summary Reports.     I have reviewed the Nursing Notes.    I have reviewed the Medications.     Review of Systems  Anesthesia Hx:  No problems with previous Anesthesia  Denies Family Hx of Anesthesia complications.   Denies Personal Hx of Anesthesia complications.   Cardiovascular:   Hypertension CAD  CABG/stent  CHF    Pulmonary:  Pulmonary Normal    Renal/:   Chronic Renal Disease    Hepatic/GI:   GERD    Musculoskeletal:   Arthritis     Neurological:   CVA    Endocrine:   Diabetes        Physical Exam  General: Well nourished, Cooperative and  Alert  Oriented to self and location      Anesthesia Plan  Type of Anesthesia, risks & benefits discussed:    Anesthesia Type: Gen ETT  Intra-op Monitoring Plan: Standard ASA Monitors  Post Op Pain Control Plan: multimodal analgesia and IV/PO Opioids PRN  Induction:  IV  Airway Plan: Direct and Video, Post-Induction  Informed Consent: Informed consent signed with the Patient representative and all parties understand the risks and agree with anesthesia plan.  All questions answered.   ASA Score: 4  Day of Surgery Review of History & Physical: H&P Update referred to the surgeon/provider.    Ready For Surgery From Anesthesia Perspective.     .

## 2022-07-16 NOTE — PROGRESS NOTES
[facility] - Emergency Dept  General Surgery  Progress Note    Subjective:       Interval History: Multiple attempts by nurses and doctors to place NG tube unsuccessful. Patient now refusing additional attempts at bedside. No nausea or vomiting. No abdominal tenderness.     Medications:  Continuous Infusions:   lactated ringers 125 mL/hr at 07/16/22 0202     Scheduled Meds:   aspirin  81 mg Oral Daily    heparin (porcine)  5,000 Units Subcutaneous Q8H    insulin detemir U-100  8 Units Subcutaneous Daily    lactated ringers  1,000 mL Intravenous Once    levothyroxine  25 mcg Intravenous Daily    ondansetron  4 mg Intravenous Q6H    pantoprazole  40 mg Intravenous Daily     PRN Meds:dextrose 10%, glucagon (human recombinant), insulin aspart U-100, morphine, morphine, promethazine (PHENERGAN) IVPB, sodium chloride 0.9%     Review of patient's allergies indicates:   Allergen Reactions    Tylenol pm [diphenhydramine-acetaminophen]      hallucinates     Objective:     Vital Signs (Most Recent):  Temp: 98.3 °F (36.8 °C) (07/16/22 0733)  Pulse: 61 (07/16/22 0728)  Resp: 16 (07/16/22 0402)  BP: (!) 163/73 (07/16/22 0728)  SpO2: 96 % (07/16/22 0728)   Vital Signs (24h Range):  Temp:  [98.3 °F (36.8 °C)-99.1 °F (37.3 °C)] 98.3 °F (36.8 °C)  Pulse:  [55-82] 61  Resp:  [16-20] 16  SpO2:  [93 %-99 %] 96 %  BP: (113-163)/(58-82) 163/73     Weight: 65.8 kg (145 lb)  Body mass index is 22.71 kg/m².    Intake/Output - Last 3 Shifts       None            Physical Exam  Vitals and nursing note reviewed.   Cardiovascular:      Rate and Rhythm: Normal rate.   Pulmonary:      Effort: Pulmonary effort is normal. No respiratory distress.   Abdominal:      General: There is no distension.      Tenderness: There is no abdominal tenderness.   Neurological:      Mental Status: He is alert.       Significant Labs:  I have reviewed all pertinent lab results within the past 24 hours.  CBC:   Recent Labs   Lab 07/16/22  0429   WBC 9.48   RBC  3.18*   HGB 9.6*   HCT 29.4*      MCV 93   MCH 30.2   MCHC 32.7     BMP:   Recent Labs   Lab 07/16/22  0429   *      K 3.9      CO2 30*   BUN 39*   CREATININE 1.9*   CALCIUM 8.7   MG 2.3       Significant Diagnostics:  CT reviewed. KUB reviewed. Persistent large dilated stomach.     Assessment/Plan:     * Gastric outlet obstruction  86 yo male with hx of HTN, DM, CAD s/p CABG x4 who presents with gastric outlet obstruction    -NPO  -fluids  -GI consult to evaluate GOO and place NG tube versus PEG, IR refused consult.   -restart insulin with sliding scale  -home meds, IV for essentials  -PRN pain meds  -scheduled anti emetics.   -Daily labs              Brian Murillo MD  General Surgery  [facility] - Emergency Dept    I have personally taken the history and examined this patient and agree with the resident's note as stated above.         Madi Desir MD

## 2022-07-16 NOTE — ANESTHESIA POSTPROCEDURE EVALUATION
Anesthesia Post Evaluation    Patient: Bhupinder Null    Procedure(s) Performed: Procedure(s) (LRB):  EGD (ESOPHAGOGASTRODUODENOSCOPY) (N/A)    Final Anesthesia Type: general      Patient location during evaluation: PACU  Patient participation: Yes- Able to Participate  Level of consciousness: awake and alert  Post-procedure vital signs: reviewed and stable  Pain management: adequate  Airway patency: patent    PONV status at discharge: No PONV  Anesthetic complications: no      Cardiovascular status: blood pressure returned to baseline  Respiratory status: spontaneous ventilation  Hydration status: euvolemic  Follow-up not needed.          Vitals Value Taken Time   /71 07/16/22 1403   Temp 36.4 °C (97.5 °F) 07/16/22 1403   Pulse 60 07/16/22 1403   Resp 16 07/16/22 1403   SpO2 95 % 07/16/22 1403         Event Time   Out of Recovery 07/16/2022 13:40:00         Pain/Richa Score: Richa Score: 9 (7/16/2022  1:15 PM)

## 2022-07-16 NOTE — TRANSFER OF CARE
Anesthesia Transfer of Care Note    Patient: Bhupinder Null    Procedure(s) Performed: Procedure(s) (LRB):  EGD (ESOPHAGOGASTRODUODENOSCOPY) (N/A)    Patient location: PACU    Anesthesia Type: general    Transport from OR: Transported from OR on 6-10 L/min O2 by face mask with adequate spontaneous ventilation    Post pain: adequate analgesia    Post assessment: no apparent anesthetic complications and tolerated procedure well    Post vital signs: stable    Level of consciousness: awake, alert and oriented    Nausea/Vomiting: no nausea/vomiting    Complications: none    Transfer of care protocol was followed      Last vitals:   Visit Vitals  BP (!) 169/80 (BP Location: Right arm, Patient Position: Lying)   Pulse 64   Temp 36.8 °C (98.2 °F) (Tympanic)   Resp 13   Wt 65.8 kg (145 lb)   SpO2 99%   BMI 22.71 kg/m²

## 2022-07-16 NOTE — PROGRESS NOTES
Patient meets requirements for next phase of care. No acute complaints of nausea, pain, or dyspnea. NGT in place, pt instructed not to dislodge tube.

## 2022-07-16 NOTE — ED NOTES
Pt care assumed. Vitals stable and pt is oriented to person and place. 0 pain reported by the pt.

## 2022-07-16 NOTE — NURSING
Pt returned to floor via stretcher. VSS. NGT in place to left nares. Wall suction initiated at low intermittent setting per order. Pt resting comfortably. Will continue to monitor.

## 2022-07-16 NOTE — ED NOTES
Pt lying in bed, respirations even, unlabored, NAD noted, remains on bp cycling, pulse ox, and cardiac monitoring. Pt updated on plan of care, will continue to monitor

## 2022-07-16 NOTE — PROVATION PATIENT INSTRUCTIONS
Discharge Summary/Instructions after an Endoscopic Procedure  Patient Name: Bhupinder Null  Patient MRN: [id number]  Patient YOB: 1935 Saturday, July 16, 2022  Moy Holt MD  Dear patient,  As a result of recent federal legislation (The Federal Cures Act), you may   receive lab or pathology results from your procedure in your [facility]   account before your physician is able to contact you. Your physician or   their representative will relay the results to you with their   recommendations at their soonest availability.  Thank you,  RESTRICTIONS:  During your procedure today, you received medications for sedation.  These   medications may affect your judgment, balance and coordination.  Therefore,   for 24 hours, you have the following restrictions:   - DO NOT drive a car, operate machinery, make legal/financial decisions,   sign important papers or drink alcohol.    ACTIVITY:  Today: no heavy lifting, straining or running due to procedural   sedation/anesthesia.  The following day: return to full activity including work.  DIET:  Eat and drink normally unless instructed otherwise.     TREATMENT FOR COMMON SIDE EFFECTS:  - Mild abdominal pain, nausea, belching, bloating or excessive gas:  rest,   eat lightly and use a heating pad.  - Sore Throat: treat with throat lozenges and/or gargle with warm salt   water.  - Because air was used during the procedure, expelling large amounts of air   from your rectum or belching is normal.  - If a bowel prep was taken, you may not have a bowel movement for 1-3 days.    This is normal.  SYMPTOMS TO WATCH FOR AND REPORT TO YOUR PHYSICIAN:  1. Abdominal pain or bloating, other than gas cramps.  2. Chest pain.  3. Back pain.  4. Signs of infection such as: chills or fever occurring within 24 hours   after the procedure.  5. Rectal bleeding, which would show as bright red, maroon, or black stools.   (A tablespoon of blood from the rectum is not serious, especially if    hemorrhoids are present.)  6. Vomiting.  7. Weakness or dizziness.  GO DIRECTLY TO THE NEAREST EMERGENCY ROOM IF YOU HAVE ANY OF THE FOLLOWING:      Difficulty breathing              Chills and/or fever over 101 F   Persistent vomiting and/or vomiting blood   Severe abdominal pain   Severe chest pain   Black, tarry stools   Bleeding- more than one tablespoon   Any other symptom or condition that you feel may need urgent attention  Your doctor recommends these additional instructions:  If any biopsies were taken, your doctors clinic will contact you in 1 to 2   weeks with any results.  - Return patient to hospital ge for ongoing care.   - NPO.   - Continue present medications.   - Observe patient's clinical course.   - NG tube to low intermittent suction  - Can consider repeating EGD to evaluate obscured part of the stomach to   rule out masses, however no obvious masses noted on this exam.  - Consider upper GI series to better evaluate the anatomy of the hiatal   hernia.  For questions, problems or results please call your physician - Moy Holt MD at Work:  [phone].  [facility], EMERGENCY ROOM PHONE NUMBER: [phone]  IF A COMPLICATION OR EMERGENCY SITUATION ARISES AND YOU ARE UNABLE TO REACH   YOUR PHYSICIAN - GO DIRECTLY TO THE EMERGENCY ROOM.  Moy Holt MD  7/16/2022 2:30:20 PM  This report has been verified and signed electronically.  Dear patient,  As a result of recent federal legislation (The Federal Cures Act), you may   receive lab or pathology results from your procedure in your [facility]   account before your physician is able to contact you. Your physician or   their representative will relay the results to you with their   recommendations at their soonest availability.  Thank you,  PROVATION

## 2022-07-16 NOTE — NURSING TRANSFER
Nursing Transfer Note      7/16/2022     Reason patient is being transferred: meets criteria for transfer    Transfer To: 556    Transfer via stretcher    Transfer with NGT in place    Transported by PCT    Medicines sent: none    Any special needs or follow-up needed: none    Chart send with patient: Yes    Patient reassessed at: 7/16/22

## 2022-07-16 NOTE — ED PROVIDER NOTES
History:  Bhupinder Null is a 87 y.o. male who presents to the ED with Abdominal Pain (Pt transfer from [place] for gastric obstruction. Per report RN attempted to insert NG tube but pt refused. )    Described as 86yo M presenting as a transfer from [place] for gastric outlet obstruction. Patient endorses 3d epigastric abdominal pain, constant, radiating to his back, now mild with associated NBNB emesis, unable to tolerate anything by mouth. CT at OSH showed dilated stomach with concern for gastric outlet obstruction.     Review of Systems: All systems reviewed and are negative except as per history of present illness.  Constitutional: Negative for fever.   HENT: Negative for congestion.    Respiratory: Negative for shortness of breath.    Cardiovascular: Negative for chest pain.   Gastrointestinal: + for abdominal pain. +N/V  Genitourinary: Negative for dysuria.   Musculoskeletal: Negative for myalgias.   Skin: Negative for rash.   Neurological: Negative for focal weakness.   Psychiatric/Behavioral: Negative for memory loss.     Medications:   Previous Medications    AMLODIPINE (NORVASC) 5 MG TABLET    Take 2.5 mg by mouth once daily.    ASPIRIN 81 MG CHEW    Take 81 mg by mouth once daily.    ATORVASTATIN (LIPITOR) 40 MG TABLET    Take 40 mg by mouth once daily.     CILOSTAZOL (PLETAL) 50 MG TAB    Take 50 mg by mouth 2 (two) times daily.    FUROSEMIDE (LASIX) 20 MG TABLET    Take 1 tablet (20 mg total) by mouth once daily.    GABAPENTIN (NEURONTIN) 100 MG CAPSULE    100 mg 2 (two) times daily.    INSULIN LISPRO PROTAMINE-INSULIN LISPRO (HUMALOG MIX 75-25,U-100,INSULN) 100 UNIT/ML (75-25) SUSP    Humalog Mix 75-25 (U-100) Insulin 100 unit/mL subcutaneous suspension   INJECT 20 UNITS UNDER THE SKIN EVERY DAY    LEVOTHYROXINE (TIROSINT) 75 MCG CAP    Take 1 capsule by mouth once daily.    MELOXICAM (MOBIC) 7.5 MG TABLET    Take 7.5 mg by mouth 2 (two) times daily.    ONDANSETRON (ZOFRAN-ODT) 8 MG TBDL     Take 1 tablet (8 mg total) by mouth every 8 (eight) hours as needed (Nausea).    PANTOPRAZOLE (PROTONIX) 40 MG TABLET    pantoprazole 40 mg tablet,delayed release   TAKE 1 TABLET BY MOUTH EVERY DAY AS DIRECTED    ROPINIROLE (REQUIP) 1 MG TABLET    Take 1 mg by mouth. Take 1 tablet in morning. Take 3 tablets at night.    SENNA-DOCUSATE 8.6-50 MG (PERICOLACE) 8.6-50 MG PER TABLET    Take 1 tablet by mouth 2 (two) times daily.       PMH:   Past Medical History:   Diagnosis Date    Arthritis     Coronary artery disease     Diabetes mellitus     GERD (gastroesophageal reflux disease)     Hyperlipidemia     Hypertension     Melanoma 12/2014    left cheek    RLS (restless legs syndrome)     S/P CABG (coronary artery bypass graft)     *4    Stroke 2013    left side was affected  -residual left upper arm    Thyroid disease      PSH:   Past Surgical History:   Procedure Laterality Date    BYPASS GRAFT      CARDIAC SURGERY      4 vessel CABG    COSMETIC SURGERY      left side of face  skin graft from neck    EYE SURGERY      B cataract surgery    JOINT REPLACEMENT       Allergies: He is allergic to tylenol pm [diphenhydramine-acetaminophen].  Social History: Marital Status: . He  reports that he quit smoking about 61 years ago. His smoking use included cigarettes. He quit after 10.00 years of use. He has never used smokeless tobacco.. He  reports no history of alcohol use..       Exam:  VITAL SIGNS:   Vitals:    07/15/22 2238 07/15/22 2342 07/16/22 0013 07/16/22 0218   BP: 130/63  (!) 154/72    Pulse: 69 67 67    Resp: 18  16    Temp: 98.9 °F (37.2 °C)      TempSrc: Oral      SpO2: 95%  97%    Weight:    65.8 kg (145 lb)     Const: Awake and alert, NAD  Head: Atraumatic  Eyes: Normal Conjunctiva  ENT: Normal External Ears, Nose and Mouth.  Neck: Full range of motion. No meningismus.  Resp: Normal respiratory effort, No distress  Cardio: Equal and intact distal pulses  Abd: Soft, mild epigastric TTP, mild  distention, no rebound or guarding.   Skin: No petechiae or rashes  Ext: No cyanosis, or edema  Neur: Awake and alert  Psych: Normal Mood and Affect    Data:  Results for orders placed or performed during the hospital encounter of 07/15/22   CBC auto differential   Result Value Ref Range    WBC 11.90 3.90 - 12.70 K/uL    RBC 3.53 (L) 4.60 - 6.20 M/uL    Hemoglobin 10.2 (L) 14.0 - 18.0 g/dL    Hematocrit 31.0 (L) 40.0 - 54.0 %    MCV 88 82 - 98 fL    MCH 28.8 27.0 - 31.0 pg    MCHC 32.8 32.0 - 36.0 g/dL    RDW 20.4 (H) 11.5 - 14.5 %    Platelets 229 150 - 450 K/uL    MPV 7.9 7.4 - 10.4 fL    Gran # (ANC) 10.7 (H) 1.8 - 7.7 K/uL    Lymph # 0.5 (L) 1.0 - 4.8 K/uL    Mono # 0.6 0.3 - 1.0 K/uL    Eos # 0.0 0.0 - 0.5 K/uL    Baso # 0.00 0.00 - 0.20 K/uL    nRBC 0 0 /100 WBC    Gran % 90.4 (H) 38.0 - 73.0 %    Lymph % 4.1 (L) 18.0 - 48.0 %    Mono % 5.3 4.0 - 15.0 %    Eosinophil % 0.1 0.0 - 8.0 %    Basophil % 0.1 0.0 - 1.9 %    Differential Method Automated    Comprehensive metabolic panel   Result Value Ref Range    Sodium 143 136 - 145 mmol/L    Potassium 3.8 3.5 - 5.1 mmol/L    Chloride 101 95 - 110 mmol/L    CO2 39 (H) 23 - 29 mmol/L    Glucose 234 (H) 74 - 106 mg/dL    BUN 38 (H) 9 - 20 mg/dL    Creatinine 1.47 0.80 - 1.50 mg/dL    Calcium 8.9 8.4 - 10.2 mg/dL    Total Protein 7.1 6.3 - 8.2 g/dL    Albumin 4.0 3.5 - 5.2 g/dL    Total Bilirubin 0.8 0.2 - 1.3 mg/dL    Alkaline Phosphatase 122 38 - 145 U/L    AST 42 17 - 59 U/L    ALT 21 10 - 44 U/L    Anion Gap 3 (L) 8 - 16 mmol/L    eGFR if African American 49 (A) >60 mL/min/1.73 m^2    eGFR if non African American 42 (A) >60 mL/min/1.73 m^2   Troponin I   Result Value Ref Range    Troponin I 0.021 ng/mL   Magnesium   Result Value Ref Range    Magnesium 2.4 1.6 - 2.6 mg/dL   Lipase   Result Value Ref Range    Lipase Result 654 (H) 23 - 300 U/L   COVID-19 Rapid Screening   Result Value Ref Range    SARS-CoV-2 RNA, Amplification, Qual Negative Negative     Labs &  Imaging studies were reviewed independently by me.    Medical Decision Makinyo M presenting with N/V and epigastric abd pain, CT showing gastric outlet obstruction. General surgery consulted, NGT attempted to be placed but unsuccessful. Patient kept NPO and admitted to surgery service.     Clinical Impression:  1. Gastric outlet obstruction               Aggie Ocampo MD  [phone]

## 2022-07-16 NOTE — CONSULTS
[facility]  Gastroenterology  Consult Note    Patient Name: Bhupinder Null  MRN: [id number]  Admission Date: 7/15/2022  Hospital Length of Stay: 0 days  Code Status: Full Code   Attending Provider: Madi Desir Jr.,*   Consulting Provider: Yvonne Catalan MD  Primary Care Physician: Didier Roach MD  Principal Problem:Gastric outlet obstruction    Inpatient consult to Gastroenterology  Consult performed by: Yvonne Catalan MD  Consult ordered by: Brian Murillo MD        Subjective:     HPI: Bhupinder Null is a 87 y.o. male with history of HTN, CADs/p CABG who presents as a transfer from [facility] for gastric outlet obstruction.      The patient presents with a 2 day history of epigastric abdominal pain, nausea and and vomiting.  He is a poor historian due to hearing impairment.  He reports that the last time he vomited was yesterday morning, no episodes in the ED.  He had a bowel movement in the ED at OSH.  He reports that his abdominal pain is gone now.  Per chart review, it appears that he had similar symptoms about a year ago, reportedly had a paraesophageal hiatal hernia as well as inguinal hernia.  He was complaining of dysphagia and weight loss about a year ago, was  Supposed to undergo paraesophageal hiatal hernia repair but that was never scheduled.  He reports undergoing EGD at some point in the past but no records are available.    On admission, he was afebrile and hemodynamically stable.  Multiple attempts at NG tube placement by nursing and surgery failed.  GI was consulted for NG tube placement.  CT abdomen pelvis with significantly distended stomach, concern for internal hernia.    Past Medical History:   Diagnosis Date    Arthritis     Coronary artery disease     Diabetes mellitus     GERD (gastroesophageal reflux disease)     Hyperlipidemia     Hypertension     Melanoma 12/2014    left cheek    RLS (restless legs syndrome)     S/P CABG (coronary artery bypass graft)      *4    Stroke     left side was affected  -residual left upper arm    Thyroid disease        Past Surgical History:   Procedure Laterality Date    BYPASS GRAFT      CARDIAC SURGERY      4 vessel CABG    COSMETIC SURGERY      left side of face  skin graft from neck    EYE SURGERY      B cataract surgery    JOINT REPLACEMENT         Family History   Problem Relation Age of Onset    Stroke Mother     Diabetes type II Mother     Stomach cancer Father     Skin cancer Brother     Breast cancer Sister     Skin cancer Sister        Social History     Socioeconomic History    Marital status:     Years of education: 7   Tobacco Use    Smoking status: Former Smoker     Years: 10.00     Types: Cigarettes     Quit date: 1961     Years since quittin.2    Smokeless tobacco: Never Used   Substance and Sexual Activity    Alcohol use: No     Alcohol/week: 0.0 standard drinks    Drug use: No    Sexual activity: Yes     Partners: Female       Current Facility-Administered Medications on File Prior to Encounter   Medication Dose Route Frequency Provider Last Rate Last Admin    [COMPLETED] 0.9%  NaCl infusion   Intravenous ED 1 Time Guy J. Lefort, MD   Stopped at 07/15/22 2054    [COMPLETED] ondansetron injection 4 mg  4 mg Intravenous ED 1 Time Guy J. Lefort, MD   4 mg at 07/15/22 1751     Current Outpatient Medications on File Prior to Encounter   Medication Sig Dispense Refill    amLODIPine (NORVASC) 5 MG tablet Take 2.5 mg by mouth once daily.      aspirin 81 MG Chew Take 81 mg by mouth once daily.      atorvastatin (LIPITOR) 40 MG tablet Take 40 mg by mouth once daily.       cilostazol (PLETAL) 50 MG Tab Take 50 mg by mouth 2 (two) times daily.      furosemide (LASIX) 20 MG tablet Take 1 tablet (20 mg total) by mouth once daily. 90 tablet 3    gabapentin (NEURONTIN) 100 MG capsule 100 mg 2 (two) times daily.      insulin lispro protamine-insulin lispro (HUMALOG MIX  75-25,U-100,INSULN) 100 unit/mL (75-25) Susp Humalog Mix 75-25 (U-100) Insulin 100 unit/mL subcutaneous suspension   INJECT 20 UNITS UNDER THE SKIN EVERY DAY      levothyroxine (TIROSINT) 75 mcg Cap Take 1 capsule by mouth once daily.      meloxicam (MOBIC) 7.5 MG tablet Take 7.5 mg by mouth 2 (two) times daily.      ondansetron (ZOFRAN-ODT) 8 MG TbDL Take 1 tablet (8 mg total) by mouth every 8 (eight) hours as needed (Nausea). 60 tablet 3    pantoprazole (PROTONIX) 40 MG tablet pantoprazole 40 mg tablet,delayed release   TAKE 1 TABLET BY MOUTH EVERY DAY AS DIRECTED      rOPINIRole (REQUIP) 1 MG tablet Take 1 mg by mouth. Take 1 tablet in morning. Take 3 tablets at night.      senna-docusate 8.6-50 mg (PERICOLACE) 8.6-50 mg per tablet Take 1 tablet by mouth 2 (two) times daily. 60 tablet 3       Review of patient's allergies indicates:   Allergen Reactions    Tylenol pm [diphenhydramine-acetaminophen]      hallucinates       Review of Systems   Constitutional: Negative.    HENT: Negative.    Eyes: Negative.    Respiratory: Negative.    Cardiovascular: Negative.    Gastrointestinal: Positive for abdominal pain, nausea and vomiting.   Genitourinary: Negative.    Musculoskeletal: Negative.    Neurological: Negative.    Psychiatric/Behavioral: Negative.         Objective:     Vitals:    07/16/22 1107   BP: (!) 163/72   Pulse: 68   Resp: 16   Temp: 96.2 °F (35.7 °C)         Constitutional:  not in acute distress and well developed  HENT: Head: Normal, normocephalic, atraumatic.  Eyes: conjunctiva clear and sclera nonicteric  Cardiovascular: regular rate and rhythm  Respiratory: normal chest expansion & respiratory effort   and no accessory muscle use  GI: soft, non-tender, without masses or organomegaly  Musculoskeletal: no muscular tenderness noted  Skin: normal color  Neurological: alert, oriented  Psychiatric: normal mood, poor historian    Significant Labs:  Recent Labs   Lab 07/15/22  8457 07/16/22  5891    HGB 10.2* 9.6*       Lab Results   Component Value Date    WBC 9.48 07/16/2022    HGB 9.6 (L) 07/16/2022    HCT 29.4 (L) 07/16/2022    MCV 93 07/16/2022     07/16/2022       Lab Results   Component Value Date     07/16/2022    K 3.9 07/16/2022     07/16/2022    CO2 30 (H) 07/16/2022    BUN 39 (H) 07/16/2022    CREATININE 1.9 (H) 07/16/2022    CALCIUM 8.7 07/16/2022    ANIONGAP 10 07/16/2022    ESTGFRAFRICA 35.8 (A) 07/16/2022    EGFRNONAA 31.0 (A) 07/16/2022       Lab Results   Component Value Date    ALT 11 07/16/2022    AST 17 07/16/2022    ALKPHOS 97 07/16/2022    BILITOT 0.8 07/16/2022       Lab Results   Component Value Date    INR 1.1 08/18/2016    INR 1.1 08/17/2016    INR 1.1 08/16/2016       Significant Imaging:  Reviewed pertinent radiology findings.       Assessment/Plan:     Bhupinder Null is a 87 y.o. male with history of HTN, CAD s/p CABG who presents as a transfer from [facility] for gastric outlet obstruction.    Problem List:  1. Gastric outlet obstruction  2. History of para-esophageal hernia    The patient presents with nausea, vomiting and abdominal pain, imaging consistent with gastric outlet obstruction, cause is unclear at this time, concern for malignancy.  Multiple NG tube placement attempts failed, therefore will plan for urgent EGD for NG tube placement and  Cause of underlying gastric outlet obstruction.  His lipase is elevated and he is pain free at this time, pancreas normal on imaging so does not fit criteria for pancreatitis.    Recommendations:  - Plan for EGD today  - Keep NPO  - Please ensure Hgb >7, plts >50, INR <1.5 on the day of procedure  - Hold anticoagulation unless contraindicated      Thank you for involving us in the care of Bhupinder Null. Please call with any additional questions, concerns or changes in the patient's clinical status. We will continue to follow.    Yvonne Catalan MD  Gastroenterology Fellow PGY VI  [facility]

## 2022-07-16 NOTE — NURSING
Pt admitted to the floor via stretcher. Transferred to bed without difficulty. Oriented to room and environment. VSS. Will continue to monitor.

## 2022-07-16 NOTE — ASSESSMENT & PLAN NOTE
86 yo male with hx of HTN, DM, CAD s/p CABG x4 who presents with gastric outlet obstruction    -NPO  -fluids  -GI consult to evaluate GOO and place NG tube versus PEG, IR refused consult.   -restart insulin with sliding scale  -home meds, IV for essentials  -PRN pain meds  -scheduled anti emetics.   -Daily labs

## 2022-07-16 NOTE — SUBJECTIVE & OBJECTIVE
Current Facility-Administered Medications on File Prior to Encounter   Medication    [COMPLETED] 0.9%  NaCl infusion    [COMPLETED] ondansetron injection 4 mg     Current Outpatient Medications on File Prior to Encounter   Medication Sig    amLODIPine (NORVASC) 5 MG tablet Take 2.5 mg by mouth once daily.    aspirin 81 MG Chew Take 81 mg by mouth once daily.    atorvastatin (LIPITOR) 40 MG tablet Take 40 mg by mouth once daily.     cilostazol (PLETAL) 50 MG Tab Take 50 mg by mouth 2 (two) times daily.    furosemide (LASIX) 20 MG tablet Take 1 tablet (20 mg total) by mouth once daily.    gabapentin (NEURONTIN) 100 MG capsule 100 mg 2 (two) times daily.    insulin lispro protamine-insulin lispro (HUMALOG MIX 75-25,U-100,INSULN) 100 unit/mL (75-25) Susp Humalog Mix 75-25 (U-100) Insulin 100 unit/mL subcutaneous suspension   INJECT 20 UNITS UNDER THE SKIN EVERY DAY    levothyroxine (TIROSINT) 75 mcg Cap Take 1 capsule by mouth once daily.    meloxicam (MOBIC) 7.5 MG tablet Take 7.5 mg by mouth 2 (two) times daily.    ondansetron (ZOFRAN-ODT) 8 MG TbDL Take 1 tablet (8 mg total) by mouth every 8 (eight) hours as needed (Nausea).    pantoprazole (PROTONIX) 40 MG tablet pantoprazole 40 mg tablet,delayed release   TAKE 1 TABLET BY MOUTH EVERY DAY AS DIRECTED    rOPINIRole (REQUIP) 1 MG tablet Take 1 mg by mouth. Take 1 tablet in morning. Take 3 tablets at night.    senna-docusate 8.6-50 mg (PERICOLACE) 8.6-50 mg per tablet Take 1 tablet by mouth 2 (two) times daily.       Review of patient's allergies indicates:   Allergen Reactions    Tylenol pm [diphenhydramine-acetaminophen]      hallucinates       Past Medical History:   Diagnosis Date    Arthritis     Coronary artery disease     Diabetes mellitus     GERD (gastroesophageal reflux disease)     Hyperlipidemia     Hypertension     Melanoma 12/2014    left cheek    RLS (restless legs syndrome)     S/P CABG (coronary artery bypass graft)     *4    Stroke 2013    left side  was affected  -residual left upper arm    Thyroid disease      Past Surgical History:   Procedure Laterality Date    BYPASS GRAFT      CARDIAC SURGERY      4 vessel CABG    COSMETIC SURGERY      left side of face  skin graft from neck    EYE SURGERY      B cataract surgery    JOINT REPLACEMENT       Family History       Problem Relation (Age of Onset)    Breast cancer Sister    Diabetes type II Mother    Skin cancer Brother, Sister    Stomach cancer Father    Stroke Mother          Tobacco Use    Smoking status: Former Smoker     Years: 10.00     Types: Cigarettes     Quit date: 1961     Years since quittin.2    Smokeless tobacco: Never Used   Substance and Sexual Activity    Alcohol use: No     Alcohol/week: 0.0 standard drinks    Drug use: No    Sexual activity: Yes     Partners: Female     Review of Systems   Constitutional:  Negative for chills and fever.   HENT:  Negative for hearing loss and voice change.    Eyes:  Negative for discharge and redness.   Respiratory:  Negative for shortness of breath.    Cardiovascular:  Negative for chest pain.   Gastrointestinal:  Positive for abdominal pain and vomiting. Negative for diarrhea and nausea.   Musculoskeletal:  Negative for gait problem.   Skin:  Negative for pallor.   Neurological:  Negative for weakness.   Objective:     Vital Signs (Most Recent):  Temp: 98.9 °F (37.2 °C) (07/15/22 2238)  Pulse: 67 (22 0013)  Resp: 16 (22 0013)  BP: (!) 154/72 (22 0013)  SpO2: 97 % (22 0013)   Vital Signs (24h Range):  Temp:  [98.9 °F (37.2 °C)-99.1 °F (37.3 °C)] 98.9 °F (37.2 °C)  Pulse:  [67-82] 67  Resp:  [16-20] 16  SpO2:  [93 %-97 %] 97 %  BP: (130-156)/(63-82) 154/72        There is no height or weight on file to calculate BMI.    Physical Exam  Vitals reviewed.   Constitutional:       General: He is not in acute distress.     Appearance: He is well-developed.   HENT:      Head: Normocephalic and atraumatic.   Eyes:      General:          Right eye: No discharge.         Left eye: No discharge.      Conjunctiva/sclera: Conjunctivae normal.   Cardiovascular:      Rate and Rhythm: Normal rate and regular rhythm.   Pulmonary:      Effort: Pulmonary effort is normal. No respiratory distress.   Abdominal:      General: There is distension.      Palpations: Abdomen is soft.      Tenderness: There is no abdominal tenderness. There is no guarding or rebound.      Comments: Abdominal exam without tenderness to palpation, moderately distended  Reducible R inguinal hernia   Musculoskeletal:         General: No deformity. Normal range of motion.      Cervical back: Normal range of motion and neck supple.   Skin:     General: Skin is warm and dry.   Neurological:      Mental Status: He is alert and oriented to person, place, and time.   Psychiatric:         Behavior: Behavior normal.       Significant Labs:  I have reviewed all pertinent lab results within the past 24 hours.  CBC:   Recent Labs   Lab 07/15/22  1745   WBC 11.90   RBC 3.53*   HGB 10.2*   HCT 31.0*      MCV 88   MCH 28.8   MCHC 32.8     CMP:   Recent Labs   Lab 07/15/22  1745   *   CALCIUM 8.9   ALBUMIN 4.0   PROT 7.1      K 3.8   CO2 39*      BUN 38*   CREATININE 1.47   ALKPHOS 122   ALT 21   AST 42   BILITOT 0.8       Significant Diagnostics:  I have reviewed all pertinent imaging results/findings within the past 24 hours.    CT  Marked gastric distention, suggesting the presence of gastric outlet obstruction.     Cluster of nondilated proximal small bowel which extends inferiorly into the lower abdomen, raising the possibility of an internal hernia.     Moderate size right inguinal hernia containing bowel without evidence of bowel obstruction.

## 2022-07-16 NOTE — CONSULTS
[facility] - Emergency Dept  General Surgery  Consult Note    Patient Name: Bhupinder Null  MRN: [id number]  Code Status: Full Code  Admission Date: 7/15/2022  Hospital Length of Stay: 0 days  Attending Physician: Aggie Ocampo MD   Primary Care Provider: Didier Roach MD    Patient information was obtained from patient, past medical records and ER records.     Inpatient consult to General surgery  Consult performed by: Truong Sosa MD  Consult ordered by: Truong Sosa MD        Subjective:     Principal Problem: Gastric outlet obstruction    History of Present Illness: Bhupinder Null is a 87 y.o. male with PMHx of HTN, CAD s/p CABG x4 who presents to ED as transfer with gastric outlet obstruction. Two days ago he developed upper abdominal pain.  He had one small episode of emesis at that time, denies emesis today. He notes a BM today. Nurse at OSH made two attempts at NGT placement but unsuccessful and patient refused further attempts. He has had similar symptoms in the past, was previously evaluated for HH repair though never scheduled. Denies prior abd surgeries.   CT demonstrates a markedly dilated stomach. He is hemodynamically stable. WBC of 11.9, labs overall unremarkable.             Current Facility-Administered Medications on File Prior to Encounter   Medication    [COMPLETED] 0.9%  NaCl infusion    [COMPLETED] ondansetron injection 4 mg     Current Outpatient Medications on File Prior to Encounter   Medication Sig    amLODIPine (NORVASC) 5 MG tablet Take 2.5 mg by mouth once daily.    aspirin 81 MG Chew Take 81 mg by mouth once daily.    atorvastatin (LIPITOR) 40 MG tablet Take 40 mg by mouth once daily.     cilostazol (PLETAL) 50 MG Tab Take 50 mg by mouth 2 (two) times daily.    furosemide (LASIX) 20 MG tablet Take 1 tablet (20 mg total) by mouth once daily.    gabapentin (NEURONTIN) 100 MG capsule 100 mg 2 (two) times daily.    insulin lispro protamine-insulin lispro (HUMALOG  MIX 75-25,U-100,INSULN) 100 unit/mL (75-25) Susp Humalog Mix 75-25 (U-100) Insulin 100 unit/mL subcutaneous suspension   INJECT 20 UNITS UNDER THE SKIN EVERY DAY    levothyroxine (TIROSINT) 75 mcg Cap Take 1 capsule by mouth once daily.    meloxicam (MOBIC) 7.5 MG tablet Take 7.5 mg by mouth 2 (two) times daily.    ondansetron (ZOFRAN-ODT) 8 MG TbDL Take 1 tablet (8 mg total) by mouth every 8 (eight) hours as needed (Nausea).    pantoprazole (PROTONIX) 40 MG tablet pantoprazole 40 mg tablet,delayed release   TAKE 1 TABLET BY MOUTH EVERY DAY AS DIRECTED    rOPINIRole (REQUIP) 1 MG tablet Take 1 mg by mouth. Take 1 tablet in morning. Take 3 tablets at night.    senna-docusate 8.6-50 mg (PERICOLACE) 8.6-50 mg per tablet Take 1 tablet by mouth 2 (two) times daily.       Review of patient's allergies indicates:   Allergen Reactions    Tylenol pm [diphenhydramine-acetaminophen]      hallucinates       Past Medical History:   Diagnosis Date    Arthritis     Coronary artery disease     Diabetes mellitus     GERD (gastroesophageal reflux disease)     Hyperlipidemia     Hypertension     Melanoma 2014    left cheek    RLS (restless legs syndrome)     S/P CABG (coronary artery bypass graft)     *4    Stroke     left side was affected  -residual left upper arm    Thyroid disease      Past Surgical History:   Procedure Laterality Date    BYPASS GRAFT      CARDIAC SURGERY      4 vessel CABG    COSMETIC SURGERY      left side of face  skin graft from neck    EYE SURGERY      B cataract surgery    JOINT REPLACEMENT       Family History       Problem Relation (Age of Onset)    Breast cancer Sister    Diabetes type II Mother    Skin cancer Brother, Sister    Stomach cancer Father    Stroke Mother          Tobacco Use    Smoking status: Former Smoker     Years: 10.00     Types: Cigarettes     Quit date: 1961     Years since quittin.2    Smokeless tobacco: Never Used   Substance and Sexual  Activity    Alcohol use: No     Alcohol/week: 0.0 standard drinks    Drug use: No    Sexual activity: Yes     Partners: Female     Review of Systems   Constitutional:  Negative for chills and fever.   HENT:  Negative for hearing loss and voice change.    Eyes:  Negative for discharge and redness.   Respiratory:  Negative for shortness of breath.    Cardiovascular:  Negative for chest pain.   Gastrointestinal:  Positive for abdominal pain and vomiting. Negative for diarrhea and nausea.   Musculoskeletal:  Negative for gait problem.   Skin:  Negative for pallor.   Neurological:  Negative for weakness.   Objective:     Vital Signs (Most Recent):  Temp: 98.9 °F (37.2 °C) (07/15/22 2238)  Pulse: 67 (07/16/22 0013)  Resp: 16 (07/16/22 0013)  BP: (!) 154/72 (07/16/22 0013)  SpO2: 97 % (07/16/22 0013)   Vital Signs (24h Range):  Temp:  [98.9 °F (37.2 °C)-99.1 °F (37.3 °C)] 98.9 °F (37.2 °C)  Pulse:  [67-82] 67  Resp:  [16-20] 16  SpO2:  [93 %-97 %] 97 %  BP: (130-156)/(63-82) 154/72        There is no height or weight on file to calculate BMI.    Physical Exam  Vitals reviewed.   Constitutional:       General: He is not in acute distress.     Appearance: He is well-developed.   HENT:      Head: Normocephalic and atraumatic.   Eyes:      General:         Right eye: No discharge.         Left eye: No discharge.      Conjunctiva/sclera: Conjunctivae normal.   Cardiovascular:      Rate and Rhythm: Normal rate and regular rhythm.   Pulmonary:      Effort: Pulmonary effort is normal. No respiratory distress.   Abdominal:      General: There is distension.      Palpations: Abdomen is soft.      Tenderness: There is no abdominal tenderness. There is no guarding or rebound.      Comments: Abdominal exam without tenderness to palpation, moderately distended  Reducible R inguinal hernia   Musculoskeletal:         General: No deformity. Normal range of motion.      Cervical back: Normal range of motion and neck supple.   Skin:      General: Skin is warm and dry.   Neurological:      Mental Status: He is alert and oriented to person, place, and time.   Psychiatric:         Behavior: Behavior normal.       Significant Labs:  I have reviewed all pertinent lab results within the past 24 hours.  CBC:   Recent Labs   Lab 07/15/22  1745   WBC 11.90   RBC 3.53*   HGB 10.2*   HCT 31.0*      MCV 88   MCH 28.8   MCHC 32.8     CMP:   Recent Labs   Lab 07/15/22  1745   *   CALCIUM 8.9   ALBUMIN 4.0   PROT 7.1      K 3.8   CO2 39*      BUN 38*   CREATININE 1.47   ALKPHOS 122   ALT 21   AST 42   BILITOT 0.8       Significant Diagnostics:  I have reviewed all pertinent imaging results/findings within the past 24 hours.    CT  Marked gastric distention, suggesting the presence of gastric outlet obstruction.     Cluster of nondilated proximal small bowel which extends inferiorly into the lower abdomen, raising the possibility of an internal hernia.     Moderate size right inguinal hernia containing bowel without evidence of bowel obstruction.      Assessment/Plan:     * Gastric outlet obstruction  86 yo male with hx of HTN, DM, CAD s/p CABG x4 who presents with gastric outlet obstruction    - Admit to general surgery  - Multiple attempts made at NGT placement, eventually with administration of 1 mg of Versed, however unable to successful pass NGT. Pt refusing further attempts.   - Plan for IR consult for NGT placement for assistance  - Strict NPO, aspiration precautions  - mIVF  - Scheduled zofran, prn phenergan  - Pain meds prn  - Sliding scale insulin  - Daily labs  - Please call with questions          VTE Risk Mitigation (From admission, onward)         Ordered     enoxaparin injection 40 mg  Daily         07/16/22 0109     IP VTE HIGH RISK PATIENT  Once         07/16/22 0109     Place sequential compression device  Until discontinued         07/16/22 0109                    Truong Sosa MD  General Surgery  [facility] -  Emergency Dept    I have personally taken the history and examined this patient and agree with the resident's note as stated above.       Will plan for EGD for decompression and NGT placement.    Madi Desir MD

## 2022-07-16 NOTE — ASSESSMENT & PLAN NOTE
88 yo male with hx of HTN, DM, CAD s/p CABG x4 who presents with gastric outlet obstruction    - Admit to general surgery  - Multiple attempts made at NGT placement, eventually with administration of 1 mg of Versed, however unable to successful pass NGT. Pt refusing further attempts.   - Plan for IR consult for NGT placement for assistance  - Strict NPO, aspiration precautions  - mIVF  - Scheduled zofran, prn phenergan  - Pain meds prn  - Sliding scale insulin  - Daily labs  - Please call with questions

## 2022-07-16 NOTE — SUBJECTIVE & OBJECTIVE
Interval History: Multiple attempts by nurses and doctors to place NG tube unsuccessful. Patient now refusing additional attempts at bedside. No nausea or vomiting. No abdominal tenderness.     Medications:  Continuous Infusions:   lactated ringers 125 mL/hr at 07/16/22 0202     Scheduled Meds:   aspirin  81 mg Oral Daily    heparin (porcine)  5,000 Units Subcutaneous Q8H    insulin detemir U-100  8 Units Subcutaneous Daily    lactated ringers  1,000 mL Intravenous Once    levothyroxine  25 mcg Intravenous Daily    ondansetron  4 mg Intravenous Q6H    pantoprazole  40 mg Intravenous Daily     PRN Meds:dextrose 10%, glucagon (human recombinant), insulin aspart U-100, morphine, morphine, promethazine (PHENERGAN) IVPB, sodium chloride 0.9%     Review of patient's allergies indicates:   Allergen Reactions    Tylenol pm [diphenhydramine-acetaminophen]      hallucinates     Objective:     Vital Signs (Most Recent):  Temp: 98.3 °F (36.8 °C) (07/16/22 0733)  Pulse: 61 (07/16/22 0728)  Resp: 16 (07/16/22 0402)  BP: (!) 163/73 (07/16/22 0728)  SpO2: 96 % (07/16/22 0728)   Vital Signs (24h Range):  Temp:  [98.3 °F (36.8 °C)-99.1 °F (37.3 °C)] 98.3 °F (36.8 °C)  Pulse:  [55-82] 61  Resp:  [16-20] 16  SpO2:  [93 %-99 %] 96 %  BP: (113-163)/(58-82) 163/73     Weight: 65.8 kg (145 lb)  Body mass index is 22.71 kg/m².    Intake/Output - Last 3 Shifts       None            Physical Exam  Vitals and nursing note reviewed.   Cardiovascular:      Rate and Rhythm: Normal rate.   Pulmonary:      Effort: Pulmonary effort is normal. No respiratory distress.   Abdominal:      General: There is no distension.      Tenderness: There is no abdominal tenderness.   Neurological:      Mental Status: He is alert.       Significant Labs:  I have reviewed all pertinent lab results within the past 24 hours.  CBC:   Recent Labs   Lab 07/16/22  0429   WBC 9.48   RBC 3.18*   HGB 9.6*   HCT 29.4*      MCV 93   MCH 30.2   MCHC 32.7     BMP:    Recent Labs   Lab 07/16/22  0429   *      K 3.9      CO2 30*   BUN 39*   CREATININE 1.9*   CALCIUM 8.7   MG 2.3       Significant Diagnostics:  CT reviewed. KUB reviewed. Persistent large dilated stomach.

## 2022-07-17 LAB
ALBUMIN SERPL BCP-MCNC: 2.8 G/DL (ref 3.5–5.2)
ALP SERPL-CCNC: 94 U/L (ref 55–135)
ALT SERPL W/O P-5'-P-CCNC: 10 U/L (ref 10–44)
ANION GAP SERPL CALC-SCNC: 5 MMOL/L (ref 8–16)
AST SERPL-CCNC: 17 U/L (ref 10–40)
BASOPHILS # BLD AUTO: 0.04 K/UL (ref 0–0.2)
BASOPHILS NFR BLD: 0.5 % (ref 0–1.9)
BILIRUB SERPL-MCNC: 0.6 MG/DL (ref 0.1–1)
BUN SERPL-MCNC: 26 MG/DL (ref 8–23)
CALCIUM SERPL-MCNC: 8.8 MG/DL (ref 8.7–10.5)
CHLORIDE SERPL-SCNC: 110 MMOL/L (ref 95–110)
CO2 SERPL-SCNC: 27 MMOL/L (ref 23–29)
CREAT SERPL-MCNC: 1.3 MG/DL (ref 0.5–1.4)
DIFFERENTIAL METHOD: ABNORMAL
EOSINOPHIL # BLD AUTO: 0.5 K/UL (ref 0–0.5)
EOSINOPHIL NFR BLD: 6.4 % (ref 0–8)
ERYTHROCYTE [DISTWIDTH] IN BLOOD BY AUTOMATED COUNT: 18.2 % (ref 11.5–14.5)
EST. GFR  (AFRICAN AMERICAN): 56.7 ML/MIN/1.73 M^2
EST. GFR  (NON AFRICAN AMERICAN): 49.1 ML/MIN/1.73 M^2
GLUCOSE SERPL-MCNC: 104 MG/DL (ref 70–110)
HCT VFR BLD AUTO: 30.2 % (ref 40–54)
HGB BLD-MCNC: 9.2 G/DL (ref 14–18)
IMM GRANULOCYTES # BLD AUTO: 0.02 K/UL (ref 0–0.04)
IMM GRANULOCYTES NFR BLD AUTO: 0.3 % (ref 0–0.5)
LYMPHOCYTES # BLD AUTO: 0.8 K/UL (ref 1–4.8)
LYMPHOCYTES NFR BLD: 10.6 % (ref 18–48)
MAGNESIUM SERPL-MCNC: 2.1 MG/DL (ref 1.6–2.6)
MCH RBC QN AUTO: 29.9 PG (ref 27–31)
MCHC RBC AUTO-ENTMCNC: 30.5 G/DL (ref 32–36)
MCV RBC AUTO: 98 FL (ref 82–98)
MONOCYTES # BLD AUTO: 0.7 K/UL (ref 0.3–1)
MONOCYTES NFR BLD: 9.2 % (ref 4–15)
NEUTROPHILS # BLD AUTO: 5.3 K/UL (ref 1.8–7.7)
NEUTROPHILS NFR BLD: 73 % (ref 38–73)
NRBC BLD-RTO: 0 /100 WBC
PHOSPHATE SERPL-MCNC: 2.3 MG/DL (ref 2.7–4.5)
PLATELET # BLD AUTO: 179 K/UL (ref 150–450)
PMV BLD AUTO: 10 FL (ref 9.2–12.9)
POCT GLUCOSE: 105 MG/DL (ref 70–110)
POCT GLUCOSE: 135 MG/DL (ref 70–110)
POCT GLUCOSE: 138 MG/DL (ref 70–110)
POCT GLUCOSE: 45 MG/DL (ref 70–110)
POCT GLUCOSE: 86 MG/DL (ref 70–110)
POTASSIUM SERPL-SCNC: 3.7 MMOL/L (ref 3.5–5.1)
PROT SERPL-MCNC: 5.9 G/DL (ref 6–8.4)
RBC # BLD AUTO: 3.08 M/UL (ref 4.6–6.2)
SODIUM SERPL-SCNC: 142 MMOL/L (ref 136–145)
WBC # BLD AUTO: 7.29 K/UL (ref 3.9–12.7)

## 2022-07-17 PROCEDURE — 80053 COMPREHEN METABOLIC PANEL: CPT | Performed by: STUDENT IN AN ORGANIZED HEALTH CARE EDUCATION/TRAINING PROGRAM

## 2022-07-17 PROCEDURE — 63600175 PHARM REV CODE 636 W HCPCS: Performed by: STUDENT IN AN ORGANIZED HEALTH CARE EDUCATION/TRAINING PROGRAM

## 2022-07-17 PROCEDURE — 11000001 HC ACUTE MED/SURG PRIVATE ROOM

## 2022-07-17 PROCEDURE — 36415 COLL VENOUS BLD VENIPUNCTURE: CPT | Performed by: STUDENT IN AN ORGANIZED HEALTH CARE EDUCATION/TRAINING PROGRAM

## 2022-07-17 PROCEDURE — 83735 ASSAY OF MAGNESIUM: CPT | Performed by: STUDENT IN AN ORGANIZED HEALTH CARE EDUCATION/TRAINING PROGRAM

## 2022-07-17 PROCEDURE — C9113 INJ PANTOPRAZOLE SODIUM, VIA: HCPCS | Performed by: STUDENT IN AN ORGANIZED HEALTH CARE EDUCATION/TRAINING PROGRAM

## 2022-07-17 PROCEDURE — 25000003 PHARM REV CODE 250: Performed by: STUDENT IN AN ORGANIZED HEALTH CARE EDUCATION/TRAINING PROGRAM

## 2022-07-17 PROCEDURE — 85025 COMPLETE CBC W/AUTO DIFF WBC: CPT | Performed by: STUDENT IN AN ORGANIZED HEALTH CARE EDUCATION/TRAINING PROGRAM

## 2022-07-17 PROCEDURE — 84100 ASSAY OF PHOSPHORUS: CPT | Performed by: STUDENT IN AN ORGANIZED HEALTH CARE EDUCATION/TRAINING PROGRAM

## 2022-07-17 PROCEDURE — 25000003 PHARM REV CODE 250: Performed by: SURGERY

## 2022-07-17 RX ORDER — ROPINIROLE 1 MG/1
3 TABLET, FILM COATED ORAL NIGHTLY
Status: DISCONTINUED | OUTPATIENT
Start: 2022-07-17 | End: 2022-07-18 | Stop reason: HOSPADM

## 2022-07-17 RX ORDER — AMLODIPINE BESYLATE 2.5 MG/1
2.5 TABLET ORAL DAILY
Status: DISCONTINUED | OUTPATIENT
Start: 2022-07-17 | End: 2022-07-18 | Stop reason: HOSPADM

## 2022-07-17 RX ORDER — ATORVASTATIN CALCIUM 10 MG/1
40 TABLET, FILM COATED ORAL DAILY
Status: DISCONTINUED | OUTPATIENT
Start: 2022-07-17 | End: 2022-07-18 | Stop reason: HOSPADM

## 2022-07-17 RX ORDER — CEFAZOLIN SODIUM/WATER 2 G/20 ML
2 SYRINGE (ML) INTRAVENOUS
Status: CANCELLED | OUTPATIENT
Start: 2022-07-17

## 2022-07-17 RX ORDER — SODIUM CHLORIDE 9 MG/ML
INJECTION, SOLUTION INTRAVENOUS CONTINUOUS
Status: CANCELLED | OUTPATIENT
Start: 2022-07-17

## 2022-07-17 RX ORDER — LEVOTHYROXINE SODIUM 75 UG/1
75 TABLET ORAL DAILY
Status: DISCONTINUED | OUTPATIENT
Start: 2022-07-17 | End: 2022-07-18 | Stop reason: HOSPADM

## 2022-07-17 RX ORDER — ROPINIROLE 1 MG/1
1 TABLET, FILM COATED ORAL 3 TIMES DAILY
Status: DISCONTINUED | OUTPATIENT
Start: 2022-07-17 | End: 2022-07-18 | Stop reason: HOSPADM

## 2022-07-17 RX ORDER — MUPIROCIN 20 MG/G
OINTMENT TOPICAL 2 TIMES DAILY
Status: DISCONTINUED | OUTPATIENT
Start: 2022-07-17 | End: 2022-07-18 | Stop reason: HOSPADM

## 2022-07-17 RX ADMIN — ROPINIROLE HYDROCHLORIDE 1 MG: 1 TABLET, FILM COATED ORAL at 09:07

## 2022-07-17 RX ADMIN — ONDANSETRON 4 MG: 2 INJECTION INTRAMUSCULAR; INTRAVENOUS at 12:07

## 2022-07-17 RX ADMIN — ATORVASTATIN CALCIUM 40 MG: 10 TABLET, FILM COATED ORAL at 09:07

## 2022-07-17 RX ADMIN — LEVOTHYROXINE SODIUM 75 MCG: 75 TABLET ORAL at 09:07

## 2022-07-17 RX ADMIN — ROPINIROLE HYDROCHLORIDE 1 MG: 1 TABLET, FILM COATED ORAL at 02:07

## 2022-07-17 RX ADMIN — ASPIRIN 81 MG CHEWABLE TABLET 81 MG: 81 TABLET CHEWABLE at 09:07

## 2022-07-17 RX ADMIN — HEPARIN SODIUM 5000 UNITS: 5000 INJECTION INTRAVENOUS; SUBCUTANEOUS at 02:07

## 2022-07-17 RX ADMIN — AMLODIPINE BESYLATE 2.5 MG: 2.5 TABLET ORAL at 09:07

## 2022-07-17 RX ADMIN — ONDANSETRON 4 MG: 2 INJECTION INTRAMUSCULAR; INTRAVENOUS at 05:07

## 2022-07-17 RX ADMIN — ONDANSETRON 4 MG: 2 INJECTION INTRAMUSCULAR; INTRAVENOUS at 11:07

## 2022-07-17 RX ADMIN — PANTOPRAZOLE SODIUM 40 MG: 40 INJECTION, POWDER, FOR SOLUTION INTRAVENOUS at 09:07

## 2022-07-17 RX ADMIN — HEPARIN SODIUM 5000 UNITS: 5000 INJECTION INTRAVENOUS; SUBCUTANEOUS at 05:07

## 2022-07-17 RX ADMIN — MUPIROCIN: 20 OINTMENT TOPICAL at 09:07

## 2022-07-17 RX ADMIN — HEPARIN SODIUM 5000 UNITS: 5000 INJECTION INTRAVENOUS; SUBCUTANEOUS at 10:07

## 2022-07-17 RX ADMIN — ROPINIROLE HYDROCHLORIDE 3 MG: 1 TABLET, FILM COATED ORAL at 09:07

## 2022-07-17 RX ADMIN — INSULIN DETEMIR 8 UNITS: 100 INJECTION, SOLUTION SUBCUTANEOUS at 09:07

## 2022-07-17 NOTE — PROGRESS NOTES
[facility] - Surgery  General Surgery  Progress Note    Subjective:         Post-Op Info:  Procedure(s) (LRB):  EGD (ESOPHAGOGASTRODUODENOSCOPY) (N/A)   1 Day Post-Op     Interval History: Had EGD done yesterday with NG tube placement. Significant relief. Having bowel function. Minimal out NG tube today.     Medications:  Continuous Infusions:   lactated ringers 125 mL/hr at 07/16/22 0202     Scheduled Meds:   amLODIPine  2.5 mg Oral Daily    aspirin  81 mg Oral Daily    atorvastatin  40 mg Oral Daily    heparin (porcine)  5,000 Units Subcutaneous Q8H    insulin detemir U-100  8 Units Subcutaneous Daily    levothyroxine  1 capsule Oral Daily    mupirocin   Nasal BID    ondansetron  4 mg Intravenous Q6H    pantoprazole  40 mg Intravenous Daily    rOPINIRole  1 mg Oral TID    rOPINIRole  3 mg Oral QHS     PRN Meds:dextrose 10%, glucagon (human recombinant), insulin aspart U-100, morphine, morphine, promethazine (PHENERGAN) IVPB, sodium chloride 0.9%     Review of patient's allergies indicates:   Allergen Reactions    Tylenol pm [diphenhydramine-acetaminophen]      hallucinates     Objective:     Vital Signs (Most Recent):  Temp: 98.8 °F (37.1 °C) (07/17/22 0756)  Pulse: 64 (07/17/22 0756)  Resp: 16 (07/17/22 0756)  BP: (!) 179/83 (07/17/22 0756)  SpO2: (!) 87 % (07/17/22 0756)   Vital Signs (24h Range):  Temp:  [96.2 °F (35.7 °C)-98.8 °F (37.1 °C)] 98.8 °F (37.1 °C)  Pulse:  [58-68] 64  Resp:  [13-20] 16  SpO2:  [87 %-100 %] 87 %  BP: (147-179)/(63-84) 179/83     Weight: 65.8 kg (145 lb)  Body mass index is 22.71 kg/m².    Intake/Output - Last 3 Shifts         07/15 0700 07/16 0659 07/16 0700 07/17 0659 07/17 0700 07/18 0659    P.O.  0     IV Piggyback  300     Total Intake(mL/kg)  300 (4.6)     Urine (mL/kg/hr)  1525 (1)     Emesis/NG output  0     Drains  200     Total Output  1725     Net  -1425            Emesis Occurrence  0 x             Physical Exam  Vitals and nursing note reviewed.    Cardiovascular:      Rate and Rhythm: Normal rate.   Pulmonary:      Effort: Pulmonary effort is normal. No respiratory distress.   Abdominal:      General: There is no distension.      Tenderness: There is no abdominal tenderness.       Significant Labs:  I have reviewed all pertinent lab results within the past 24 hours.  CBC:   Recent Labs   Lab 07/17/22  0507   WBC 7.29   RBC 3.08*   HGB 9.2*   HCT 30.2*      MCV 98   MCH 29.9   MCHC 30.5*     BMP:   Recent Labs   Lab 07/17/22  0507         K 3.7      CO2 27   BUN 26*   CREATININE 1.3   CALCIUM 8.8   MG 2.1       Significant Diagnostics:  none    Assessment/Plan:     * Gastric outlet obstruction  86 yo male with hx of HTN, DM, CAD s/p CABG x4 who presents with gastric outlet obstruction    -remove NG  -clears for today  -IVF while trial of PO  -home meds  -PRN pain and nausea meds  -DVT ppx  -d/c tomorrow if doing well.         Brian Murillo MD  General Surgery  [facility] - Surgery

## 2022-07-17 NOTE — PLAN OF CARE
Plan of care reviewed with pt. Pt AAOx4, VS as charted. Patient on 2L NC. Purposeful rounding for pt care and safety. No reports of NV or pain. No falls or injuries reported during this shift. Skin integrity as charted. NG tube in place to L nare at LIWS with brown liquid output. PIV intact, free of irritation. Safety precautions maintained during shift- bed in low position, call light in reach, SR up x2, personal belongings in reach.

## 2022-07-17 NOTE — PLAN OF CARE
Pt resting in bed comfortably. PIV line intact and infusing, free of infection and irritation. Fall precautions maintained, no falls noted. Call light within reach, bed locked and in lowest position. Non-skid socks on while out of bed. Patient instructed to call for assistance. Skin integrity maintained as patient is independent with frequent repositioning. NGT removed this morning per orders. No complaints of pain or N/V this shift. Pt tolerating clear liquids. Progressing towards goals. Will continue to monitor and follow plan of care.

## 2022-07-17 NOTE — ASSESSMENT & PLAN NOTE
86 yo male with hx of HTN, DM, CAD s/p CABG x4 who presents with gastric outlet obstruction    -remove NG  -clears for today  -IVF while trial of PO  -home meds  -PRN pain and nausea meds  -DVT ppx  -d/c tomorrow if doing well.

## 2022-07-17 NOTE — SUBJECTIVE & OBJECTIVE
Interval History: Had EGD done yesterday with NG tube placement. Significant relief. Having bowel function. Minimal out NG tube today.     Medications:  Continuous Infusions:   lactated ringers 125 mL/hr at 07/16/22 0202     Scheduled Meds:   amLODIPine  2.5 mg Oral Daily    aspirin  81 mg Oral Daily    atorvastatin  40 mg Oral Daily    heparin (porcine)  5,000 Units Subcutaneous Q8H    insulin detemir U-100  8 Units Subcutaneous Daily    levothyroxine  1 capsule Oral Daily    mupirocin   Nasal BID    ondansetron  4 mg Intravenous Q6H    pantoprazole  40 mg Intravenous Daily    rOPINIRole  1 mg Oral TID    rOPINIRole  3 mg Oral QHS     PRN Meds:dextrose 10%, glucagon (human recombinant), insulin aspart U-100, morphine, morphine, promethazine (PHENERGAN) IVPB, sodium chloride 0.9%     Review of patient's allergies indicates:   Allergen Reactions    Tylenol pm [diphenhydramine-acetaminophen]      hallucinates     Objective:     Vital Signs (Most Recent):  Temp: 98.8 °F (37.1 °C) (07/17/22 0756)  Pulse: 64 (07/17/22 0756)  Resp: 16 (07/17/22 0756)  BP: (!) 179/83 (07/17/22 0756)  SpO2: (!) 87 % (07/17/22 0756)   Vital Signs (24h Range):  Temp:  [96.2 °F (35.7 °C)-98.8 °F (37.1 °C)] 98.8 °F (37.1 °C)  Pulse:  [58-68] 64  Resp:  [13-20] 16  SpO2:  [87 %-100 %] 87 %  BP: (147-179)/(63-84) 179/83     Weight: 65.8 kg (145 lb)  Body mass index is 22.71 kg/m².    Intake/Output - Last 3 Shifts         07/15 0700 07/16 0659 07/16 0700 07/17 0659 07/17 0700 07/18 0659    P.O.  0     IV Piggyback  300     Total Intake(mL/kg)  300 (4.6)     Urine (mL/kg/hr)  1525 (1)     Emesis/NG output  0     Drains  200     Total Output  1725     Net  -1425            Emesis Occurrence  0 x             Physical Exam  Vitals and nursing note reviewed.   Cardiovascular:      Rate and Rhythm: Normal rate.   Pulmonary:      Effort: Pulmonary effort is normal. No respiratory distress.   Abdominal:      General: There is no distension.       Tenderness: There is no abdominal tenderness.       Significant Labs:  I have reviewed all pertinent lab results within the past 24 hours.  CBC:   Recent Labs   Lab 07/17/22  0507   WBC 7.29   RBC 3.08*   HGB 9.2*   HCT 30.2*      MCV 98   MCH 29.9   MCHC 30.5*     BMP:   Recent Labs   Lab 07/17/22  0507         K 3.7      CO2 27   BUN 26*   CREATININE 1.3   CALCIUM 8.8   MG 2.1       Significant Diagnostics:  none

## 2022-07-18 VITALS
OXYGEN SATURATION: 92 % | SYSTOLIC BLOOD PRESSURE: 168 MMHG | TEMPERATURE: 98 F | RESPIRATION RATE: 18 BRPM | HEART RATE: 66 BPM | WEIGHT: 145 LBS | BODY MASS INDEX: 22.71 KG/M2 | DIASTOLIC BLOOD PRESSURE: 76 MMHG

## 2022-07-18 LAB
ALBUMIN SERPL BCP-MCNC: 2.9 G/DL (ref 3.5–5.2)
ALP SERPL-CCNC: 108 U/L (ref 55–135)
ALT SERPL W/O P-5'-P-CCNC: 11 U/L (ref 10–44)
ANION GAP SERPL CALC-SCNC: 8 MMOL/L (ref 8–16)
AST SERPL-CCNC: 21 U/L (ref 10–40)
BASOPHILS # BLD AUTO: 0.05 K/UL (ref 0–0.2)
BASOPHILS NFR BLD: 0.8 % (ref 0–1.9)
BILIRUB SERPL-MCNC: 0.9 MG/DL (ref 0.1–1)
BUN SERPL-MCNC: 18 MG/DL (ref 8–23)
CALCIUM SERPL-MCNC: 8.9 MG/DL (ref 8.7–10.5)
CHLORIDE SERPL-SCNC: 108 MMOL/L (ref 95–110)
CO2 SERPL-SCNC: 25 MMOL/L (ref 23–29)
CREAT SERPL-MCNC: 1.1 MG/DL (ref 0.5–1.4)
DIFFERENTIAL METHOD: ABNORMAL
EOSINOPHIL # BLD AUTO: 0.4 K/UL (ref 0–0.5)
EOSINOPHIL NFR BLD: 6 % (ref 0–8)
ERYTHROCYTE [DISTWIDTH] IN BLOOD BY AUTOMATED COUNT: 17.8 % (ref 11.5–14.5)
EST. GFR  (AFRICAN AMERICAN): >60 ML/MIN/1.73 M^2
EST. GFR  (NON AFRICAN AMERICAN): >60 ML/MIN/1.73 M^2
GLUCOSE SERPL-MCNC: 69 MG/DL (ref 70–110)
HCT VFR BLD AUTO: 31.3 % (ref 40–54)
HGB BLD-MCNC: 9.3 G/DL (ref 14–18)
IMM GRANULOCYTES # BLD AUTO: 0.01 K/UL (ref 0–0.04)
IMM GRANULOCYTES NFR BLD AUTO: 0.2 % (ref 0–0.5)
LYMPHOCYTES # BLD AUTO: 0.8 K/UL (ref 1–4.8)
LYMPHOCYTES NFR BLD: 11.7 % (ref 18–48)
MAGNESIUM SERPL-MCNC: 2 MG/DL (ref 1.6–2.6)
MCH RBC QN AUTO: 29.8 PG (ref 27–31)
MCHC RBC AUTO-ENTMCNC: 29.7 G/DL (ref 32–36)
MCV RBC AUTO: 100 FL (ref 82–98)
MONOCYTES # BLD AUTO: 0.5 K/UL (ref 0.3–1)
MONOCYTES NFR BLD: 8.1 % (ref 4–15)
NEUTROPHILS # BLD AUTO: 4.8 K/UL (ref 1.8–7.7)
NEUTROPHILS NFR BLD: 73.2 % (ref 38–73)
NRBC BLD-RTO: 0 /100 WBC
PHOSPHATE SERPL-MCNC: 2.3 MG/DL (ref 2.7–4.5)
PLATELET # BLD AUTO: 198 K/UL (ref 150–450)
PMV BLD AUTO: 10.8 FL (ref 9.2–12.9)
POCT GLUCOSE: 100 MG/DL (ref 70–110)
POCT GLUCOSE: 156 MG/DL (ref 70–110)
POCT GLUCOSE: 92 MG/DL (ref 70–110)
POTASSIUM SERPL-SCNC: 4 MMOL/L (ref 3.5–5.1)
PROT SERPL-MCNC: 6.2 G/DL (ref 6–8.4)
RBC # BLD AUTO: 3.12 M/UL (ref 4.6–6.2)
SODIUM SERPL-SCNC: 141 MMOL/L (ref 136–145)
WBC # BLD AUTO: 6.51 K/UL (ref 3.9–12.7)

## 2022-07-18 PROCEDURE — 85025 COMPLETE CBC W/AUTO DIFF WBC: CPT | Performed by: STUDENT IN AN ORGANIZED HEALTH CARE EDUCATION/TRAINING PROGRAM

## 2022-07-18 PROCEDURE — 25000003 PHARM REV CODE 250: Performed by: STUDENT IN AN ORGANIZED HEALTH CARE EDUCATION/TRAINING PROGRAM

## 2022-07-18 PROCEDURE — 63600175 PHARM REV CODE 636 W HCPCS: Performed by: STUDENT IN AN ORGANIZED HEALTH CARE EDUCATION/TRAINING PROGRAM

## 2022-07-18 PROCEDURE — 83735 ASSAY OF MAGNESIUM: CPT | Performed by: STUDENT IN AN ORGANIZED HEALTH CARE EDUCATION/TRAINING PROGRAM

## 2022-07-18 PROCEDURE — 36415 COLL VENOUS BLD VENIPUNCTURE: CPT | Performed by: STUDENT IN AN ORGANIZED HEALTH CARE EDUCATION/TRAINING PROGRAM

## 2022-07-18 PROCEDURE — C9113 INJ PANTOPRAZOLE SODIUM, VIA: HCPCS | Performed by: STUDENT IN AN ORGANIZED HEALTH CARE EDUCATION/TRAINING PROGRAM

## 2022-07-18 PROCEDURE — 84100 ASSAY OF PHOSPHORUS: CPT | Performed by: STUDENT IN AN ORGANIZED HEALTH CARE EDUCATION/TRAINING PROGRAM

## 2022-07-18 PROCEDURE — 80053 COMPREHEN METABOLIC PANEL: CPT | Performed by: STUDENT IN AN ORGANIZED HEALTH CARE EDUCATION/TRAINING PROGRAM

## 2022-07-18 RX ORDER — POLYETHYLENE GLYCOL 3350 17 G/17G
17 POWDER, FOR SOLUTION ORAL DAILY
Status: DISCONTINUED | OUTPATIENT
Start: 2022-07-18 | End: 2022-07-18

## 2022-07-18 RX ORDER — BISACODYL 10 MG
10 SUPPOSITORY, RECTAL RECTAL ONCE
Status: DISCONTINUED | OUTPATIENT
Start: 2022-07-18 | End: 2022-07-18

## 2022-07-18 RX ADMIN — ATORVASTATIN CALCIUM 40 MG: 10 TABLET, FILM COATED ORAL at 09:07

## 2022-07-18 RX ADMIN — ASPIRIN 81 MG CHEWABLE TABLET 81 MG: 81 TABLET CHEWABLE at 09:07

## 2022-07-18 RX ADMIN — AMLODIPINE BESYLATE 2.5 MG: 2.5 TABLET ORAL at 09:07

## 2022-07-18 RX ADMIN — HEPARIN SODIUM 5000 UNITS: 5000 INJECTION INTRAVENOUS; SUBCUTANEOUS at 06:07

## 2022-07-18 RX ADMIN — INSULIN DETEMIR 8 UNITS: 100 INJECTION, SOLUTION SUBCUTANEOUS at 09:07

## 2022-07-18 RX ADMIN — ONDANSETRON 4 MG: 2 INJECTION INTRAMUSCULAR; INTRAVENOUS at 06:07

## 2022-07-18 RX ADMIN — LEVOTHYROXINE SODIUM 75 MCG: 75 TABLET ORAL at 06:07

## 2022-07-18 RX ADMIN — ROPINIROLE HYDROCHLORIDE 1 MG: 1 TABLET, FILM COATED ORAL at 09:07

## 2022-07-18 RX ADMIN — MUPIROCIN: 20 OINTMENT TOPICAL at 09:07

## 2022-07-18 RX ADMIN — PANTOPRAZOLE SODIUM 40 MG: 40 INJECTION, POWDER, FOR SOLUTION INTRAVENOUS at 09:07

## 2022-07-18 NOTE — ASSESSMENT & PLAN NOTE
86 yo male with hx of HTN, DM, CAD s/p CABG x4 who presents with gastric outlet obstruction    -advance to fulls  -d/c fluids  -home meds  -PRN pain and nausea meds  -DVT ppx  -d/c today

## 2022-07-18 NOTE — PROGRESS NOTES
[facility]  General Surgery  Progress Note    Subjective:     Post-Op Info:  Procedure(s) (LRB):  EGD (ESOPHAGOGASTRODUODENOSCOPY) (N/A)   2 Days Post-Op     Interval History: No acute events overnight. No nausea or vomiting. No abdominal pain. Tolerating diet.     Medications:  Continuous Infusions:   lactated ringers 125 mL/hr at 07/16/22 0202     Scheduled Meds:   amLODIPine  2.5 mg Oral Daily    aspirin  81 mg Oral Daily    atorvastatin  40 mg Oral Daily    heparin (porcine)  5,000 Units Subcutaneous Q8H    insulin detemir U-100  8 Units Subcutaneous Daily    levothyroxine  75 mcg Oral Daily    mupirocin   Nasal BID    ondansetron  4 mg Intravenous Q6H    pantoprazole  40 mg Intravenous Daily    rOPINIRole  1 mg Oral TID    rOPINIRole  3 mg Oral QHS     PRN Meds:dextrose 10%, glucagon (human recombinant), insulin aspart U-100, morphine, morphine, promethazine (PHENERGAN) IVPB, sodium chloride 0.9%     Review of patient's allergies indicates:   Allergen Reactions    Tylenol pm [diphenhydramine-acetaminophen]      hallucinates     Objective:     Vital Signs (Most Recent):  Temp: 98.3 °F (36.8 °C) (07/18/22 0554)  Pulse: 68 (07/18/22 0554)  Resp: 20 (07/18/22 0554)  BP: (!) 141/65 (07/18/22 0554)  SpO2: (!) 94 % (07/18/22 0554)   Vital Signs (24h Range):  Temp:  [97.5 °F (36.4 °C)-98.8 °F (37.1 °C)] 98.3 °F (36.8 °C)  Pulse:  [57-69] 68  Resp:  [16-20] 20  SpO2:  [90 %-97 %] 94 %  BP: (141-179)/(65-83) 141/65     Weight: 65.8 kg (145 lb)  Body mass index is 22.71 kg/m².    Intake/Output - Last 3 Shifts         07/16 0700 07/17 0659 07/17 0700 07/18 0659    P.O. 0 560    IV Piggyback 300     Total Intake(mL/kg) 300 (4.6) 560 (8.5)    Urine (mL/kg/hr) 1525 (1) 1000 (0.6)    Emesis/NG output 0     Drains 200     Total Output 1725 1000    Net -1425 -440          Emesis Occurrence 0 x             Physical Exam  Vitals and nursing note reviewed.   Cardiovascular:      Rate and Rhythm: Normal rate.    Pulmonary:      Effort: Pulmonary effort is normal. No respiratory distress.   Abdominal:      General: There is no distension.      Tenderness: There is no abdominal tenderness.       Significant Labs:  pending      Significant Diagnostics:  KUB reviewed. No gastric bubble.     Assessment/Plan:     * Gastric outlet obstruction  88 yo male with hx of HTN, DM, CAD s/p CABG x4 who presents with gastric outlet obstruction    -advance to fulls  -d/c fluids  -home meds  -PRN pain and nausea meds  -DVT ppx  -d/c today        Brian Murillo MD  General Surgery  [facility] - Surgery

## 2022-07-18 NOTE — SUBJECTIVE & OBJECTIVE
Interval History: No acute events overnight. No nausea or vomiting. No abdominal pain. Tolerating diet.     Medications:  Continuous Infusions:   lactated ringers 125 mL/hr at 07/16/22 0202     Scheduled Meds:   amLODIPine  2.5 mg Oral Daily    aspirin  81 mg Oral Daily    atorvastatin  40 mg Oral Daily    heparin (porcine)  5,000 Units Subcutaneous Q8H    insulin detemir U-100  8 Units Subcutaneous Daily    levothyroxine  75 mcg Oral Daily    mupirocin   Nasal BID    ondansetron  4 mg Intravenous Q6H    pantoprazole  40 mg Intravenous Daily    rOPINIRole  1 mg Oral TID    rOPINIRole  3 mg Oral QHS     PRN Meds:dextrose 10%, glucagon (human recombinant), insulin aspart U-100, morphine, morphine, promethazine (PHENERGAN) IVPB, sodium chloride 0.9%     Review of patient's allergies indicates:   Allergen Reactions    Tylenol pm [diphenhydramine-acetaminophen]      hallucinates     Objective:     Vital Signs (Most Recent):  Temp: 98.3 °F (36.8 °C) (07/18/22 0554)  Pulse: 68 (07/18/22 0554)  Resp: 20 (07/18/22 0554)  BP: (!) 141/65 (07/18/22 0554)  SpO2: (!) 94 % (07/18/22 0554)   Vital Signs (24h Range):  Temp:  [97.5 °F (36.4 °C)-98.8 °F (37.1 °C)] 98.3 °F (36.8 °C)  Pulse:  [57-69] 68  Resp:  [16-20] 20  SpO2:  [90 %-97 %] 94 %  BP: (141-179)/(65-83) 141/65     Weight: 65.8 kg (145 lb)  Body mass index is 22.71 kg/m².    Intake/Output - Last 3 Shifts         07/16 0700 07/17 0659 07/17 0700 07/18 0659    P.O. 0 560    IV Piggyback 300     Total Intake(mL/kg) 300 (4.6) 560 (8.5)    Urine (mL/kg/hr) 1525 (1) 1000 (0.6)    Emesis/NG output 0     Drains 200     Total Output 1725 1000    Net -1425 -440          Emesis Occurrence 0 x             Physical Exam  Vitals and nursing note reviewed.   Cardiovascular:      Rate and Rhythm: Normal rate.   Pulmonary:      Effort: Pulmonary effort is normal. No respiratory distress.   Abdominal:      General: There is no distension.      Tenderness: There is no abdominal  tenderness.       Significant Labs:  pending      Significant Diagnostics:  KUB reviewed. No gastric bubble.

## 2022-07-18 NOTE — PROGRESS NOTES
Blood sugar:45. Patient is aysmtomatic at present. Awake, alert, and oriented. Skin is warm and dry. No immediate distress noted will notify intern on call and initiate dextrose infusion. Will continue to monitor.

## 2022-07-18 NOTE — NURSING
Pt ready for discharge. D/c instructions given to pt. Verbalized understanding. No further questions asked.  Removed PIV. Tolerated well. Awaiting family that will provide transportation.

## 2022-07-18 NOTE — PLAN OF CARE
[facility]  Discharge Final Note    Primary Care Provider: Diider Roach MD    Expected Discharge Date: 7/18/2022    Final Discharge Note (most recent)     Final Note - 07/18/22 1331        Final Note    Assessment Type Final Discharge Note     Anticipated Discharge Disposition Home or Self Care     What phone number can be called within the next 1-3 days to see how you are doing after discharge? [phone]     Hospital Resources/Appts/Education Provided Provided patient/caregiver with written discharge plan information;Appointments scheduled by Navigator/Coordinator                    Contact Info     Madi Desir Jr, MD   Specialty: General Surgery, Bariatrics    [address] [facility]  [facility] [address]   Phone: [phone]       Next Steps: Schedule an appointment as soon as possible for a visit    Instructions: To discuss next steps.

## 2022-07-18 NOTE — DISCHARGE INSTRUCTIONS
When you go home, have a full liquid/soft diet for a week. Go easy on high sugar foods and carbohydrates. Do not eat too many raw vegetables. These things are hard to digest.

## 2022-07-18 NOTE — PLAN OF CARE
[facility]  Discharge Assessment    Primary Care Provider: Didier Roach MD     Discharge Assessment (most recent)     BRIEF DISCHARGE ASSESSMENT - 07/18/22 1313        Discharge Planning    Assessment Type Discharge Planning Brief Assessment     Resource/Environmental Concerns none     Support Systems Spouse/significant other;Family members     Current Living Arrangements home/apartment/condo     Patient/Family Anticipates Transition to home with family     Patient/Family Anticipated Services at Transition none     DME Needed Upon Discharge  none     Discharge Plan A Home with family     Discharge Plan B Home with family

## 2022-07-18 NOTE — PROGRESS NOTES
07/18/22 0915   WOCN Assessment   WOCN Total Time (mins) 15   Visit Date 07/18/22   Visit Time 0915   Consult Type New   WOCN Speciality Wound   Wound   (weeping crusting legs)   Intervention assessed;changed;applied;chart review;coordination of care;consult other service;orders   Skin Interventions   Device Skin Pressure Protection adhesive use limited   Pressure Reduction Techniques frequent weight shift encouraged   Skin Protection adhesive use limited   Positioning   Body Position   (sitting up in chair)        Altered Skin Integrity 07/18/22 0915 Left anterior;lower Leg   Date First Assessed/Time First Assessed: 07/18/22 0915   Side: Left  Orientation: anterior;lower  Location: Leg   Wound Image         Altered Skin Integrity 07/18/22 0915 Right anterior;lower Leg   Date First Assessed/Time First Assessed: 07/18/22 0915   Side: Right  Orientation: anterior;lower  Location: Leg   Wound Image    Patient has no open wounds to left leg and no wounds to right leg. Both legs are wrapped with calamine lite once a week and prn for saturation.

## 2022-07-18 NOTE — PROGRESS NOTES
07/18/22 0915   WOCN Assessment   WOCN Total Time (mins) 15   Visit Date 07/18/22   Visit Time 0915   Consult Type New   WOCN Speciality Wound   Wound   (weeping crusting legs)   Intervention assessed;changed;applied;chart review;coordination of care;consult other service;orders   Skin Interventions   Device Skin Pressure Protection adhesive use limited   Pressure Reduction Techniques frequent weight shift encouraged   Skin Protection adhesive use limited   Positioning   Body Position   (sitting up in chair)        Altered Skin Integrity 07/18/22 0915 Left anterior;lower Leg   Date First Assessed/Time First Assessed: 07/18/22 0915   Side: Left  Orientation: anterior;lower  Location: Leg   Wound Image         Altered Skin Integrity 07/18/22 0915 Right anterior;lower Leg   Date First Assessed/Time First Assessed: 07/18/22 0915   Side: Right  Orientation: anterior;lower  Location: Leg   Wound Image    Patient consult for wound care to bi-lower extremities . Both legs are wrapped with calamine lite once a week and prn.

## 2022-07-19 ENCOUNTER — PATIENT OUTREACH (OUTPATIENT)
Dept: ADMINISTRATIVE | Facility: CLINIC | Age: 87
End: 2022-07-19
Payer: MEDICARE

## 2022-07-19 NOTE — DISCHARGE SUMMARY
[facility]  DISCHARGE SUMMARY  General Surgery      Admit Date:  7/15/2022    Discharge Date:  7/19/2022      Attending Physician:  Madi Desir MD      Discharge Provider:  Lisy Ferrari MD     Reason for Admission:  Gastric outlet obstruction     Procedures Performed:  Procedure(s) (LRB):  EGD (ESOPHAGOGASTRODUODENOSCOPY) (N/A)    History of Present Illness: Bhupinder Null is a 87 y.o. male with PMHx of HTN, CAD s/p CABG x4 who presents to ED as transfer with gastric outlet obstruction. Two days ago he developed upper abdominal pain.  He had one small episode of emesis at that time, denies emesis today. He notes a BM today. Nurse at OSH made two attempts at NGT placement but unsuccessful and patient refused further attempts. He has had similar symptoms in the past, was previously evaluated for HH repair though never scheduled. Denies prior abd surgeries.   CT demonstrates a markedly dilated stomach. He is hemodynamically stable. WBC of 11.9, labs overall unremarkable.     Hospital Course:  NG tube was placed for decompression and the patient experienced return of bowel function. Pain improved significantly. Tolerating diet. Decision made to not proceed with HH repair at this time.     Labs and vital signs remained stable and appropriate throughout course.  Diet was advanced as tolerated and the patient's pain was controlled on oral pain medications without problem.  Currently, the patient is doing well and is stable and appropriate for discharge at this time.      Consults:  None.    Significant Diagnostic Studies:   Recent Labs   Lab 07/17/22  0507 07/18/22  0317   WBC 7.29 6.51   HGB 9.2* 9.3*   HCT 30.2* 31.3*    198     Recent Labs   Lab 07/17/22  0507 07/18/22  0317    141   K 3.7 4.0    108   CO2 27 25   BUN 26* 18   CREATININE 1.3 1.1    69*   CALCIUM 8.8 8.9   MG 2.1 2.0   PHOS 2.3* 2.3*   AST 17 21   ALT 10 11   ALKPHOS 94 108   BILITOT 0.6 0.9   PROT 5.9*  6.2   ALBUMIN 2.8* 2.9*   No results for input(s): INR, PTT, LABHEPA, LACTATE, TROPONINI, CPK, CPKMB, MB, BNP in the last 72 hours.No results for input(s): PH, PCO2, PO2, HCO3 in the last 72 hours.      Final Diagnoses:   Principal Problem:  Gastric outlet obstruction   Secondary Diagnoses:    Active Hospital Problems    Diagnosis  POA    *Gastric outlet obstruction [K31.1]  Yes    Hiatal hernia [K44.9]  Yes      Resolved Hospital Problems   No resolved problems to display.       Discharged Condition:  Good    Disposition: Home    Follow Up/Patient Instructions:     Medications:  Reconciled Home Medications:    Discharge Medication List as of 7/18/2022  9:39 AM      CONTINUE these medications which have NOT CHANGED    Details   amLODIPine (NORVASC) 5 MG tablet Take 2.5 mg by mouth once daily., Historical Med      aspirin 81 MG Chew Take 81 mg by mouth once daily., Historical Med      atorvastatin (LIPITOR) 40 MG tablet Take 40 mg by mouth once daily. , Starting Sun 7/12/2015, Historical Med      cilostazol (PLETAL) 50 MG Tab Take 50 mg by mouth 2 (two) times daily., Historical Med      furosemide (LASIX) 20 MG tablet Take 1 tablet (20 mg total) by mouth once daily., Starting Wed 5/4/2022, Until Thu 5/4/2023, Normal      gabapentin (NEURONTIN) 100 MG capsule 100 mg 2 (two) times daily., Starting Sun 2/14/2016, Historical Med      insulin lispro protamine-insulin lispro (HUMALOG MIX 75-25,U-100,INSULN) 100 unit/mL (75-25) Susp Humalog Mix 75-25 (U-100) Insulin 100 unit/mL subcutaneous suspension   INJECT 20 UNITS UNDER THE SKIN EVERY DAY, Historical Med      levothyroxine (TIROSINT) 75 mcg Cap Take 1 capsule by mouth once daily., Historical Med      meloxicam (MOBIC) 7.5 MG tablet Take 7.5 mg by mouth 2 (two) times daily., Historical Med      ondansetron (ZOFRAN-ODT) 8 MG TbDL Take 1 tablet (8 mg total) by mouth every 8 (eight) hours as needed (Nausea)., Starting Wed 5/4/2022, Until Tue 8/2/2022 at 2359, Normal       pantoprazole (PROTONIX) 40 MG tablet pantoprazole 40 mg tablet,delayed release   TAKE 1 TABLET BY MOUTH EVERY DAY AS DIRECTED, Historical Med      rOPINIRole (REQUIP) 1 MG tablet Take 1 mg by mouth. Take 1 tablet in morning. Take 3 tablets at night., Starting Mon 10/4/2021, Historical Med      senna-docusate 8.6-50 mg (PERICOLACE) 8.6-50 mg per tablet Take 1 tablet by mouth 2 (two) times daily., Starting Wed 5/4/2022, Normal           Discharge Procedure Orders   Diet full liquid     No dressing needed   Order Comments: Over your wounds you have surgical glue. This will fall off on its own in 2-3 weeks. Okay to shower tomorrow. Do not soak in any baths, pools, or spas for 2 weeks.     Notify your health care provider if you experience any of the following:  temperature >100.4     Notify your health care provider if you experience any of the following:  persistent nausea and vomiting or diarrhea     Notify your health care provider if you experience any of the following:  severe uncontrolled pain     Notify your health care provider if you experience any of the following:  redness, tenderness, or signs of infection (pain, swelling, redness, odor or green/yellow discharge around incision site)     Notify your health care provider if you experience any of the following:  difficulty breathing or increased cough     Notify your health care provider if you experience any of the following:  severe persistent headache     Notify your health care provider if you experience any of the following:  worsening rash     Notify your health care provider if you experience any of the following:  persistent dizziness, light-headedness, or visual disturbances     Notify your health care provider if you experience any of the following:  increased confusion or weakness     Activity as tolerated      Follow-up Information     Madi Desir Jr, MD. Schedule an appointment as soon as possible for a visit.    Specialties: General  Surgery, Bariatrics  Why: To discuss next steps.  Contact information:  [address]  [facility] [address]  [phone]                         Lisy Ferrari MD

## 2022-07-19 NOTE — PROGRESS NOTES
C3 nurse spoke with Bhupinder Null's spouse, Meliza Null, for a TCC post hospital discharge follow up call. The patient has a scheduled HOSFU appointment with Didier Roach MD on 7/21/2022 @ 2018.

## 2022-07-21 ENCOUNTER — TELEPHONE (OUTPATIENT)
Dept: SURGERY | Facility: CLINIC | Age: 87
End: 2022-07-21
Payer: MEDICARE

## 2022-08-10 ENCOUNTER — OFFICE VISIT (OUTPATIENT)
Dept: SURGERY | Facility: CLINIC | Age: 87
End: 2022-08-10
Payer: MEDICARE

## 2022-08-10 VITALS
HEART RATE: 53 BPM | HEIGHT: 70 IN | DIASTOLIC BLOOD PRESSURE: 64 MMHG | BODY MASS INDEX: 18.4 KG/M2 | SYSTOLIC BLOOD PRESSURE: 142 MMHG | WEIGHT: 128.5 LBS

## 2022-08-10 DIAGNOSIS — I25.10 CORONARY ARTERY DISEASE WITHOUT ANGINA PECTORIS, UNSPECIFIED VESSEL OR LESION TYPE, UNSPECIFIED WHETHER NATIVE OR TRANSPLANTED HEART: ICD-10-CM

## 2022-08-10 DIAGNOSIS — K44.9 HIATAL HERNIA: Primary | ICD-10-CM

## 2022-08-10 PROCEDURE — 1111F DSCHRG MED/CURRENT MED MERGE: CPT | Mod: CPTII,S$GLB,, | Performed by: SURGERY

## 2022-08-10 PROCEDURE — 1101F PT FALLS ASSESS-DOCD LE1/YR: CPT | Mod: CPTII,S$GLB,, | Performed by: SURGERY

## 2022-08-10 PROCEDURE — 3288F PR FALLS RISK ASSESSMENT DOCUMENTED: ICD-10-PCS | Mod: CPTII,S$GLB,, | Performed by: SURGERY

## 2022-08-10 PROCEDURE — 1111F PR DISCHARGE MEDS RECONCILED W/ CURRENT OUTPATIENT MED LIST: ICD-10-PCS | Mod: CPTII,S$GLB,, | Performed by: SURGERY

## 2022-08-10 PROCEDURE — 1126F AMNT PAIN NOTED NONE PRSNT: CPT | Mod: CPTII,S$GLB,, | Performed by: SURGERY

## 2022-08-10 PROCEDURE — 99999 PR PBB SHADOW E&M-EST. PATIENT-LVL III: CPT | Mod: PBBFAC,,, | Performed by: SURGERY

## 2022-08-10 PROCEDURE — 99214 PR OFFICE/OUTPT VISIT, EST, LEVL IV, 30-39 MIN: ICD-10-PCS | Mod: S$GLB,,, | Performed by: SURGERY

## 2022-08-10 PROCEDURE — 1159F PR MEDICATION LIST DOCUMENTED IN MEDICAL RECORD: ICD-10-PCS | Mod: CPTII,S$GLB,, | Performed by: SURGERY

## 2022-08-10 PROCEDURE — 1160F PR REVIEW ALL MEDS BY PRESCRIBER/CLIN PHARMACIST DOCUMENTED: ICD-10-PCS | Mod: CPTII,S$GLB,, | Performed by: SURGERY

## 2022-08-10 PROCEDURE — 99214 OFFICE O/P EST MOD 30 MIN: CPT | Mod: S$GLB,,, | Performed by: SURGERY

## 2022-08-10 PROCEDURE — 1126F PR PAIN SEVERITY QUANTIFIED, NO PAIN PRESENT: ICD-10-PCS | Mod: CPTII,S$GLB,, | Performed by: SURGERY

## 2022-08-10 PROCEDURE — 1160F RVW MEDS BY RX/DR IN RCRD: CPT | Mod: CPTII,S$GLB,, | Performed by: SURGERY

## 2022-08-10 PROCEDURE — 99999 PR PBB SHADOW E&M-EST. PATIENT-LVL III: ICD-10-PCS | Mod: PBBFAC,,, | Performed by: SURGERY

## 2022-08-10 PROCEDURE — 1159F MED LIST DOCD IN RCRD: CPT | Mod: CPTII,S$GLB,, | Performed by: SURGERY

## 2022-08-10 PROCEDURE — 3288F FALL RISK ASSESSMENT DOCD: CPT | Mod: CPTII,S$GLB,, | Performed by: SURGERY

## 2022-08-10 PROCEDURE — 1157F PR ADVANCE CARE PLAN OR EQUIV PRESENT IN MEDICAL RECORD: ICD-10-PCS | Mod: CPTII,S$GLB,, | Performed by: SURGERY

## 2022-08-10 PROCEDURE — 1157F ADVNC CARE PLAN IN RCRD: CPT | Mod: CPTII,S$GLB,, | Performed by: SURGERY

## 2022-08-10 PROCEDURE — 1101F PR PT FALLS ASSESS DOC 0-1 FALLS W/OUT INJ PAST YR: ICD-10-PCS | Mod: CPTII,S$GLB,, | Performed by: SURGERY

## 2022-08-10 NOTE — PROGRESS NOTES
"GENERAL SURGERY CLINIC NOTE    CC:  Paraesophageal hernia    HPI:  Bhupinder Null is a 87 y.o. male with PMHx of DMI, HTN, CAD s/p CABG x4 who presents to ED as transfer with gastric outlet obstruction who was recently hospitalized. Family accompanies him today. During his last hospitalization, obstruction resolved with conservative management (NGT, bowel rest) and he experienced return of bowel function. There was no surgical intervention at that time. He underwent EGD revealing a large paraesophageal hernia. CT revealing paraesophageal hernia with severe dilation of stomach with food - indicative of possible gastric outlet obstruction.    He returns to clinic today to discuss his hiatal hernia. He reports he is still having "black, thick" emesis daily with any kinds of food he eats. Because of this he is feeling miserable and says he will take any risk necessary for some relief.  Complaining of a constant pressure in his chest, and that food sometimes feels like it gets caught in his throat. Easier with liquids or very soft foods.       ROS:   Review of Systems   Constitutional: Negative for chills, fever and weight loss.   Eyes: Negative.    Respiratory: Negative.    Cardiovascular: Negative for chest pain.   Gastrointestinal: Positive for abdominal pain, diarrhea and vomiting. Negative for blood in stool.   Genitourinary: Negative.    Neurological: Negative.    Psychiatric/Behavioral: Negative.         Past Medical History:   Diagnosis Date    Arthritis     Coronary artery disease     Diabetes mellitus     GERD (gastroesophageal reflux disease)     Hyperlipidemia     Hypertension     Melanoma 12/2014    left cheek    RLS (restless legs syndrome)     S/P CABG (coronary artery bypass graft)     *4    Stroke 2013    left side was affected  -residual left upper arm    Thyroid disease        Past Surgical History:   Procedure Laterality Date    BYPASS GRAFT      CARDIAC SURGERY      4 vessel CABG    " COSMETIC SURGERY      left side of face  skin graft from neck    ESOPHAGOGASTRODUODENOSCOPY N/A 2022    Procedure: EGD (ESOPHAGOGASTRODUODENOSCOPY);  Surgeon: Moy Holt MD;  Location: [address]);  Service: Endoscopy;  Laterality: N/A;    EYE SURGERY      B cataract surgery    JOINT REPLACEMENT         Current Outpatient Medications on File Prior to Visit   Medication Sig Dispense Refill    amLODIPine (NORVASC) 5 MG tablet Take 2.5 mg by mouth once daily.      aspirin 81 MG Chew Take 81 mg by mouth once daily.      atorvastatin (LIPITOR) 40 MG tablet Take 40 mg by mouth once daily.       cilostazol (PLETAL) 50 MG Tab Take 50 mg by mouth 2 (two) times daily.      furosemide (LASIX) 20 MG tablet Take 1 tablet (20 mg total) by mouth once daily. 90 tablet 3    gabapentin (NEURONTIN) 100 MG capsule 100 mg 2 (two) times daily.      insulin lispro protamine-insulin lispro (HUMALOG MIX 75-25,U-100,INSULN) 100 unit/mL (75-25) Susp Humalog Mix 75-25 (U-100) Insulin 100 unit/mL subcutaneous suspension   INJECT 20 UNITS UNDER THE SKIN EVERY DAY      levothyroxine (TIROSINT) 75 mcg Cap Take 1 capsule by mouth once daily.      meloxicam (MOBIC) 7.5 MG tablet Take 7.5 mg by mouth 2 (two) times daily.      pantoprazole (PROTONIX) 40 MG tablet pantoprazole 40 mg tablet,delayed release   TAKE 1 TABLET BY MOUTH EVERY DAY AS DIRECTED      rOPINIRole (REQUIP) 1 MG tablet Take 1 mg by mouth. Take 1 tablet in morning. Take 3 tablets at night.      senna-docusate 8.6-50 mg (PERICOLACE) 8.6-50 mg per tablet Take 1 tablet by mouth 2 (two) times daily. 60 tablet 3     No current facility-administered medications on file prior to visit.       Social History     Socioeconomic History    Marital status:     Years of education: 7   Tobacco Use    Smoking status: Former Smoker     Years: 10.00     Types: Cigarettes     Quit date: 1961     Years since quittin.3    Smokeless tobacco: Never  Used   Substance and Sexual Activity    Alcohol use: No     Alcohol/week: 0.0 standard drinks    Drug use: No    Sexual activity: Yes     Partners: Female       Review of patient's allergies indicates:   Allergen Reactions    Tylenol pm [diphenhydramine-acetaminophen]      hallucinates         PHYSICAL EXAM:  There were no vitals filed for this visit.    Physical Exam  Constitutional:       Comments: Thin appearing   HENT:      Head: Normocephalic and atraumatic.   Eyes:      Pupils: Pupils are equal, round, and reactive to light.   Cardiovascular:      Rate and Rhythm: Normal rate.   Pulmonary:      Effort: Pulmonary effort is normal. No respiratory distress.   Abdominal:      General: Abdomen is flat.      Palpations: Abdomen is soft.   Musculoskeletal:         General: Normal range of motion.      Cervical back: Normal range of motion.   Skin:     General: Skin is warm and dry.   Neurological:      General: No focal deficit present.      Mental Status: He is alert and oriented to person, place, and time.   Psychiatric:         Mood and Affect: Mood normal.         Behavior: Behavior normal.         PERTINENT LABS:  Reviewed.       PERTINENT IMAGING:  Reviewed.       ASSESSMENT/PLAN:  Bhupinder Null is a 87 y.o. male with hx of DMI, CAD s/p CABG, HTN, paraesophageal hernia who presents to clinic today to discuss surgery. Dilation of stomach past diaphragm indicative of possible outlet obstruction vs gastroparesis - nargis in setting of DM.     - UGI  - Cardiology clearance  - Gastric emptying study  - Recommend liquid diet - ensure, premier protein. Avoid solid foods, meats & hard vegetables.   - Consider HH alone, HH repair with G tube, or PEG alone depending on risk and FU studies.      Lisy Ferrari MD  [facility] General Surgery PGY-III  Pager: [phone]      I have personally taken the history and examined this patient and agree with the resident's note as stated above.         Madi Desir,  MD

## 2022-08-16 ENCOUNTER — HOSPITAL ENCOUNTER (OUTPATIENT)
Dept: RADIOLOGY | Facility: HOSPITAL | Age: 87
Discharge: HOME | End: 2022-08-16
Attending: SURGERY
Payer: MEDICARE

## 2022-08-16 DIAGNOSIS — K44.9 HIATAL HERNIA: ICD-10-CM

## 2022-08-16 PROCEDURE — 78264 GASTRIC EMPTYING IMG STUDY: CPT | Mod: 26,,, | Performed by: RADIOLOGY

## 2022-08-16 PROCEDURE — 78264 NM GASTRIC EMPTYING: ICD-10-PCS | Mod: 26,,, | Performed by: RADIOLOGY

## 2022-08-16 PROCEDURE — 78264 GASTRIC EMPTYING IMG STUDY: CPT | Mod: TC

## 2022-08-17 ENCOUNTER — HOSPITAL ENCOUNTER (OUTPATIENT)
Dept: RADIOLOGY | Facility: HOSPITAL | Age: 87
Discharge: HOME | End: 2022-08-17
Attending: SURGERY
Payer: MEDICARE

## 2022-08-17 DIAGNOSIS — K44.9 HIATAL HERNIA: ICD-10-CM

## 2022-08-17 PROCEDURE — 74240 X-RAY XM UPR GI TRC 1CNTRST: CPT | Mod: TC

## 2022-08-17 PROCEDURE — 74240 FL UPPER GI: ICD-10-PCS | Mod: 26,,, | Performed by: RADIOLOGY

## 2022-08-17 PROCEDURE — 25500020 PHARM REV CODE 255: Performed by: SURGERY

## 2022-08-17 PROCEDURE — 74240 X-RAY XM UPR GI TRC 1CNTRST: CPT | Mod: 26,,, | Performed by: RADIOLOGY

## 2022-08-17 PROCEDURE — A9698 NON-RAD CONTRAST MATERIALNOC: HCPCS | Performed by: SURGERY

## 2022-08-17 RX ADMIN — BARIUM SULFATE 355 ML: 0.6 SUSPENSION ORAL at 09:08

## 2022-08-19 ENCOUNTER — LAB VISIT (OUTPATIENT)
Dept: LAB | Facility: HOSPITAL | Age: 87
End: 2022-08-19
Attending: INTERNAL MEDICINE
Payer: MEDICARE

## 2022-08-19 DIAGNOSIS — Z79.1 LONG TERM (CURRENT) USE OF NON-STEROIDAL ANTI-INFLAMMATORIES (NSAID): ICD-10-CM

## 2022-08-19 DIAGNOSIS — R63.4 WEIGHT LOSS: ICD-10-CM

## 2022-08-19 DIAGNOSIS — R10.30 LOWER ABDOMINAL PAIN: ICD-10-CM

## 2022-08-19 DIAGNOSIS — R19.4 CHANGE IN BOWEL HABITS: ICD-10-CM

## 2022-08-19 DIAGNOSIS — R10.13 EPIGASTRIC PAIN: ICD-10-CM

## 2022-08-19 DIAGNOSIS — K22.70 BARRETT'S ESOPHAGUS WITHOUT DYSPLASIA: ICD-10-CM

## 2022-08-19 DIAGNOSIS — K21.9 GERD (GASTROESOPHAGEAL REFLUX DISEASE): ICD-10-CM

## 2022-08-19 DIAGNOSIS — R19.7 DIARRHEA: Primary | ICD-10-CM

## 2022-08-19 DIAGNOSIS — K44.9 DIAPHRAGMATIC HERNIA WITHOUT OBSTRUCTION OR GANGRENE: ICD-10-CM

## 2022-08-19 LAB
BASOPHILS # BLD AUTO: 0.06 K/UL (ref 0–0.2)
BASOPHILS NFR BLD: 0.7 % (ref 0–1.9)
DIFFERENTIAL METHOD: ABNORMAL
EOSINOPHIL # BLD AUTO: 0.2 K/UL (ref 0–0.5)
EOSINOPHIL NFR BLD: 3 % (ref 0–8)
ERYTHROCYTE [DISTWIDTH] IN BLOOD BY AUTOMATED COUNT: 15.9 % (ref 11.5–14.5)
HCT VFR BLD AUTO: 30.7 % (ref 40–54)
HGB BLD-MCNC: 10.2 G/DL (ref 14–18)
IMM GRANULOCYTES # BLD AUTO: 0.04 K/UL (ref 0–0.04)
IMM GRANULOCYTES NFR BLD AUTO: 0.5 % (ref 0–0.5)
LYMPHOCYTES # BLD AUTO: 1 K/UL (ref 1–4.8)
LYMPHOCYTES NFR BLD: 12 % (ref 18–48)
MCH RBC QN AUTO: 31.4 PG (ref 27–31)
MCHC RBC AUTO-ENTMCNC: 33.2 G/DL (ref 32–36)
MCV RBC AUTO: 95 FL (ref 82–98)
MONOCYTES # BLD AUTO: 0.4 K/UL (ref 0.3–1)
MONOCYTES NFR BLD: 4.7 % (ref 4–15)
NEUTROPHILS # BLD AUTO: 6.4 K/UL (ref 1.8–7.7)
NEUTROPHILS NFR BLD: 79.1 % (ref 38–73)
NRBC BLD-RTO: 0 /100 WBC
PLATELET # BLD AUTO: 280 K/UL (ref 150–450)
PMV BLD AUTO: 9.3 FL (ref 9.2–12.9)
RBC # BLD AUTO: 3.25 M/UL (ref 4.6–6.2)
WBC # BLD AUTO: 8.07 K/UL (ref 3.9–12.7)

## 2022-08-19 PROCEDURE — 36415 COLL VENOUS BLD VENIPUNCTURE: CPT | Performed by: INTERNAL MEDICINE

## 2022-08-19 PROCEDURE — 85025 COMPLETE CBC W/AUTO DIFF WBC: CPT | Performed by: INTERNAL MEDICINE

## 2022-08-22 DIAGNOSIS — K44.9 HIATAL HERNIA: Primary | ICD-10-CM

## 2022-08-26 ENCOUNTER — HOSPITAL ENCOUNTER (OUTPATIENT)
Dept: RADIOLOGY | Facility: HOSPITAL | Age: 87
Discharge: HOME | End: 2022-08-26
Attending: NURSE PRACTITIONER
Payer: MEDICARE

## 2022-08-26 DIAGNOSIS — R19.7 DIARRHEA: ICD-10-CM

## 2022-08-26 DIAGNOSIS — R10.30 LOWER ABDOMINAL PAIN: ICD-10-CM

## 2022-08-26 DIAGNOSIS — R19.4 CHANGE IN BOWEL HABIT: ICD-10-CM

## 2022-08-26 DIAGNOSIS — K21.9 GERD (GASTROESOPHAGEAL REFLUX DISEASE): ICD-10-CM

## 2022-08-26 PROCEDURE — 74176 CT ABD & PELVIS W/O CONTRAST: CPT | Mod: TC

## 2022-08-26 PROCEDURE — 74176 CT ABD & PELVIS W/O CONTRAST: CPT | Mod: 26,,, | Performed by: RADIOLOGY

## 2022-08-26 PROCEDURE — 25500020 PHARM REV CODE 255: Performed by: NURSE PRACTITIONER

## 2022-08-26 PROCEDURE — 74176 CT ABDOMEN PELVIS WITHOUT CONTRAST: ICD-10-PCS | Mod: 26,,, | Performed by: RADIOLOGY

## 2022-08-26 RX ADMIN — IOHEXOL 30 ML: 300 INJECTION, SOLUTION INTRAVENOUS at 11:08

## 2022-09-22 ENCOUNTER — TELEPHONE (OUTPATIENT)
Dept: SURGERY | Facility: CLINIC | Age: 87
End: 2022-09-22
Payer: MEDICARE

## 2022-09-22 NOTE — TELEPHONE ENCOUNTER
----- Message from Madi Desir Jr., MD sent at 9/9/2022  2:57 PM CDT -----  Can we see him back.  He also needs to see pre op (Charla) to determine his safety for surgery.

## 2022-09-22 NOTE — TELEPHONE ENCOUNTER
Left VM for pt/pt wife to return phone call in regards to setting up a follow up appointment with Dr. Desir as well as scheduling an appointment with Dr. Dunham.

## 2022-09-22 NOTE — TELEPHONE ENCOUNTER
Pt wife returning my phone call -she states that they are still working with cards at this time d/t pt recently being diagnosed with afib.  She states that she will discuss with pt on how he would like to proceed.  She will call back sometime tomorrow.  She didn't have any further questions at this time.   
Full range of motion of upper and lower extremities, no joint tenderness/swelling.

## 2022-09-26 ENCOUNTER — TELEPHONE (OUTPATIENT)
Dept: SURGERY | Facility: CLINIC | Age: 87
End: 2022-09-26
Payer: MEDICARE